# Patient Record
Sex: FEMALE | Race: WHITE | Employment: UNEMPLOYED | URBAN - NONMETROPOLITAN AREA
[De-identification: names, ages, dates, MRNs, and addresses within clinical notes are randomized per-mention and may not be internally consistent; named-entity substitution may affect disease eponyms.]

---

## 2017-05-01 ENCOUNTER — HOSPITAL ENCOUNTER (OUTPATIENT)
Dept: LAB | Age: 32
Discharge: HOME | End: 2017-05-01
Payer: MEDICARE

## 2017-05-01 LAB
ALBUMIN SERPL-MCNC: 4.4 G/DL (ref 3.5–5.2)
ALBUMIN/GLOBULIN RATIO: 1.5 (ref 1–2.5)
ALP BLD-CCNC: 80 U/L (ref 35–104)
ALT SERPL-CCNC: 15 U/L (ref 5–33)
ANION GAP SERPL CALCULATED.3IONS-SCNC: 12 MMOL/L (ref 9–17)
AST SERPL-CCNC: 15 U/L
BILIRUB SERPL-MCNC: <0.1 MG/DL (ref 0.3–1.2)
BUN BLDV-MCNC: 9 MG/DL (ref 6–20)
BUN/CREAT BLD: 16 (ref 9–20)
CALCIUM SERPL-MCNC: 9.3 MG/DL (ref 8.6–10.4)
CARBAMAZEPINE DATE LAST DOSE: ABNORMAL
CARBAMAZEPINE DOSE AMOUNT: ABNORMAL
CARBAMAZEPINE DOSE TIME: ABNORMAL
CARBAMAZEPINE LEVEL: <2.5 UG/ML (ref 8–12)
CHLORIDE BLD-SCNC: 97 MMOL/L (ref 98–107)
CO2: 27 MMOL/L (ref 20–31)
CREAT SERPL-MCNC: 0.55 MG/DL (ref 0.5–0.9)
GFR AFRICAN AMERICAN: >60 ML/MIN
GFR NON-AFRICAN AMERICAN: >60 ML/MIN
GFR SERPL CREATININE-BSD FRML MDRD: ABNORMAL ML/MIN/{1.73_M2}
GFR SERPL CREATININE-BSD FRML MDRD: ABNORMAL ML/MIN/{1.73_M2}
GLUCOSE BLD-MCNC: 88 MG/DL (ref 70–99)
POTASSIUM SERPL-SCNC: 4.2 MMOL/L (ref 3.7–5.3)
SODIUM BLD-SCNC: 136 MMOL/L (ref 135–144)
TOTAL PROTEIN: 7.4 G/DL (ref 6.4–8.3)

## 2017-05-01 PROCEDURE — 80053 COMPREHEN METABOLIC PANEL: CPT

## 2017-05-01 PROCEDURE — 36415 COLL VENOUS BLD VENIPUNCTURE: CPT

## 2017-05-01 PROCEDURE — 80156 ASSAY CARBAMAZEPINE TOTAL: CPT

## 2017-06-12 RX ORDER — MAGNESIUM HYDROXIDE/ALUMINUM HYDROXICE/SIMETHICONE 120; 1200; 1200 MG/30ML; MG/30ML; MG/30ML
5 SUSPENSION ORAL EVERY 6 HOURS PRN
COMMUNITY
End: 2018-04-16 | Stop reason: ALTCHOICE

## 2017-06-12 RX ORDER — DICYCLOMINE HYDROCHLORIDE 10 MG/1
10 CAPSULE ORAL 3 TIMES DAILY PRN
COMMUNITY
End: 2019-01-15 | Stop reason: ALTCHOICE

## 2017-06-12 RX ORDER — GABAPENTIN 300 MG/1
300 CAPSULE ORAL 3 TIMES DAILY
COMMUNITY
End: 2018-04-16 | Stop reason: ALTCHOICE

## 2017-06-12 RX ORDER — TRAZODONE HYDROCHLORIDE 100 MG/1
100 TABLET ORAL NIGHTLY
COMMUNITY
End: 2018-04-16 | Stop reason: ALTCHOICE

## 2017-06-12 RX ORDER — ARIPIPRAZOLE 15 MG/1
15 TABLET ORAL DAILY
COMMUNITY
End: 2019-10-31 | Stop reason: ALTCHOICE

## 2017-06-12 RX ORDER — METHOCARBAMOL 750 MG/1
750 TABLET, FILM COATED ORAL 3 TIMES DAILY
COMMUNITY
End: 2018-04-16 | Stop reason: ALTCHOICE

## 2017-06-12 RX ORDER — RANITIDINE 300 MG/1
300 TABLET ORAL DAILY
COMMUNITY
End: 2018-04-16 | Stop reason: ALTCHOICE

## 2017-06-12 RX ORDER — CARBAMAZEPINE 100 MG/1
200 TABLET, EXTENDED RELEASE ORAL 2 TIMES DAILY
COMMUNITY
End: 2019-04-23 | Stop reason: ALTCHOICE

## 2017-06-12 RX ORDER — LORATADINE 10 MG/1
10 TABLET ORAL DAILY
COMMUNITY
End: 2018-04-16 | Stop reason: ALTCHOICE

## 2017-06-12 RX ORDER — LOPERAMIDE HYDROCHLORIDE 2 MG/1
2 CAPSULE ORAL 4 TIMES DAILY PRN
COMMUNITY
End: 2018-04-16 | Stop reason: ALTCHOICE

## 2017-08-24 ENCOUNTER — COMMUNITY OUTREACH (OUTPATIENT)
Dept: OTHER | Facility: CLINIC | Age: 32
End: 2017-08-24

## 2018-02-01 ENCOUNTER — HOSPITAL ENCOUNTER (EMERGENCY)
Age: 33
Discharge: HOME | End: 2018-02-01
Attending: EMERGENCY MEDICINE
Payer: MEDICARE

## 2018-02-01 VITALS
RESPIRATION RATE: 16 BRPM | TEMPERATURE: 96.8 F | HEART RATE: 87 BPM | SYSTOLIC BLOOD PRESSURE: 133 MMHG | BODY MASS INDEX: 33.89 KG/M2 | WEIGHT: 210 LBS | OXYGEN SATURATION: 99 % | DIASTOLIC BLOOD PRESSURE: 86 MMHG

## 2018-02-01 DIAGNOSIS — B86 SCABIES: Primary | ICD-10-CM

## 2018-02-01 PROCEDURE — 99282 EMERGENCY DEPT VISIT SF MDM: CPT

## 2018-02-01 RX ORDER — PERMETHRIN 50 MG/G
CREAM TOPICAL
Qty: 1 TUBE | Refills: 1 | Status: SHIPPED | OUTPATIENT
Start: 2018-02-01 | End: 2018-04-16 | Stop reason: ALTCHOICE

## 2018-02-01 ASSESSMENT — PAIN DESCRIPTION - PAIN TYPE: TYPE: ACUTE PAIN

## 2018-02-01 ASSESSMENT — PAIN SCALES - GENERAL: PAINLEVEL_OUTOF10: 10

## 2018-02-01 ASSESSMENT — PAIN DESCRIPTION - ORIENTATION: ORIENTATION: LEFT;RIGHT

## 2018-02-01 ASSESSMENT — PAIN DESCRIPTION - LOCATION: LOCATION: HAND;FOOT

## 2018-02-01 NOTE — ED PROVIDER NOTES
eMERGENCY dEPARTMENT eNCOUnter      CHIEF COMPLAINT    Chief Complaint   Patient presents with    Insect Bite     pt states seh thinks she has a parasite, \"there are little black bugs crawling all over me\"        HPI    Cj Garcia is a 33 y.o. female who presents With several week history of pruritic erythematous punctate rash of bilateral feet and hands which appears to be getting progressively worse. Patient describes intense nocturnal itching. Patient does live at home with multiple pets.     PAST MEDICAL HISTORY    Past Medical History:   Diagnosis Date    Bipolar affective disorder ([address]     GERD (gastroesophageal reflux disease)     Hiatal hernia     Irritable bowel syndrome     Low back pain        SURGICAL HISTORY    Past Surgical History:   Procedure Laterality Date     SECTION      STERILIZATION  2012    [facility]       CURRENT MEDICATIONS    Current Outpatient Rx   Medication Sig Dispense Refill    ranitidine (ZANTAC) 300 MG tablet Take 300 mg by mouth daily      omeprazole (PRILOSEC) 40 MG capsule Take 40 mg by mouth 2 times daily       ARIPiprazole (ABILIFY) 15 MG tablet Take 15 mg by mouth daily      carBAMazepine (TEGRETOL-XR) 100 MG extended release tablet Take 200 mg by mouth 2 times daily      dicyclomine (BENTYL) 10 MG capsule Take 10 mg by mouth 3 times daily as needed      gabapentin (NEURONTIN) 300 MG capsule Take 300 mg by mouth 3 times daily      loperamide (IMODIUM) 2 MG capsule Take 2 mg by mouth 4 times daily as needed for Diarrhea      loratadine (CLARITIN) 10 MG tablet Take 10 mg by mouth daily      aluminum & magnesium hydroxide-simethicone (MAALOX) 200-200-20 MG/5ML SUSP suspension Take 5 mLs by mouth every 6 hours as needed for Indigestion      methocarbamol (ROBAXIN) 750 MG tablet Take 750 mg by mouth 3 times daily      traZODone (DESYREL) 100 MG tablet Take 100 mg by mouth nightly      PROAIR  (90 BASE) MCG/ACT inhaler   0    fluticasone Eliu Devlin Do [facility]  [phone]      As needed        Final Impression:   1.  Scabies               (Please note that portions of this note were completed with a voice recognition program.  Efforts were made to edit the dictations but occasionally words are mis-transcribed.)          Bety Briceno DO  02/01/18 1455

## 2018-04-16 ENCOUNTER — HOSPITAL ENCOUNTER (EMERGENCY)
Age: 33
Discharge: HOME | End: 2018-04-16
Payer: MEDICARE

## 2018-04-16 VITALS
HEART RATE: 96 BPM | SYSTOLIC BLOOD PRESSURE: 145 MMHG | TEMPERATURE: 98 F | OXYGEN SATURATION: 97 % | RESPIRATION RATE: 16 BRPM | DIASTOLIC BLOOD PRESSURE: 86 MMHG

## 2018-04-16 DIAGNOSIS — R29.898 TMJ CLICK: Primary | ICD-10-CM

## 2018-04-16 DIAGNOSIS — R42 DIZZINESS: ICD-10-CM

## 2018-04-16 LAB
-: ABNORMAL
AMORPHOUS: ABNORMAL
BACTERIA: ABNORMAL
BILIRUBIN URINE: NEGATIVE
CASTS UA: ABNORMAL /LPF
COLOR: YELLOW
COMMENT UA: ABNORMAL
CRYSTALS, UA: ABNORMAL /HPF
EKG ATRIAL RATE: 79 BPM
EKG P AXIS: 57 DEGREES
EKG P-R INTERVAL: 164 MS
EKG Q-T INTERVAL: 390 MS
EKG QRS DURATION: 92 MS
EKG QTC CALCULATION (BAZETT): 447 MS
EKG R AXIS: 63 DEGREES
EKG T AXIS: 50 DEGREES
EKG VENTRICULAR RATE: 79 BPM
EPITHELIAL CELLS UA: ABNORMAL /HPF (ref 0–25)
GLUCOSE URINE: NEGATIVE
HCG(URINE) PREGNANCY TEST: NEGATIVE
KETONES, URINE: NEGATIVE
LEUKOCYTE ESTERASE, URINE: ABNORMAL
MUCUS: ABNORMAL
NITRITE, URINE: NEGATIVE
OTHER OBSERVATIONS UA: ABNORMAL
PH UA: 6.5 (ref 5–9)
PROTEIN UA: NEGATIVE
RBC UA: ABNORMAL /HPF (ref 0–2)
RENAL EPITHELIAL, UA: ABNORMAL /HPF
SPECIFIC GRAVITY UA: 1.02 (ref 1.01–1.02)
TRICHOMONAS: ABNORMAL
TURBIDITY: CLEAR
URINE HGB: ABNORMAL
UROBILINOGEN, URINE: NORMAL
WBC UA: ABNORMAL /HPF (ref 0–5)
YEAST: ABNORMAL

## 2018-04-16 PROCEDURE — 99283 EMERGENCY DEPT VISIT LOW MDM: CPT

## 2018-04-16 PROCEDURE — 93005 ELECTROCARDIOGRAM TRACING: CPT

## 2018-04-16 PROCEDURE — 96372 THER/PROPH/DIAG INJ SC/IM: CPT

## 2018-04-16 PROCEDURE — 6370000000 HC RX 637 (ALT 250 FOR IP): Performed by: PHYSICIAN ASSISTANT

## 2018-04-16 PROCEDURE — 87086 URINE CULTURE/COLONY COUNT: CPT

## 2018-04-16 PROCEDURE — 6360000002 HC RX W HCPCS: Performed by: PHYSICIAN ASSISTANT

## 2018-04-16 PROCEDURE — 81001 URINALYSIS AUTO W/SCOPE: CPT

## 2018-04-16 PROCEDURE — 84703 CHORIONIC GONADOTROPIN ASSAY: CPT

## 2018-04-16 RX ORDER — KETOROLAC TROMETHAMINE 30 MG/ML
30 INJECTION, SOLUTION INTRAMUSCULAR; INTRAVENOUS ONCE
Status: COMPLETED | OUTPATIENT
Start: 2018-04-16 | End: 2018-04-16

## 2018-04-16 RX ORDER — MECLIZINE HCL 12.5 MG/1
25 TABLET ORAL ONCE
Status: COMPLETED | OUTPATIENT
Start: 2018-04-16 | End: 2018-04-16

## 2018-04-16 RX ORDER — MECLIZINE HYDROCHLORIDE 25 MG/1
25 TABLET ORAL 3 TIMES DAILY PRN
Qty: 21 TABLET | Refills: 0 | Status: SHIPPED | OUTPATIENT
Start: 2018-04-16 | End: 2018-04-26

## 2018-04-16 RX ORDER — DIAZEPAM 2 MG/1
2 TABLET ORAL EVERY 8 HOURS PRN
Qty: 10 TABLET | Refills: 0 | Status: SHIPPED | OUTPATIENT
Start: 2018-04-16 | End: 2018-04-16 | Stop reason: CLARIF

## 2018-04-16 RX ORDER — IBUPROFEN 800 MG/1
800 TABLET ORAL EVERY 8 HOURS PRN
Qty: 21 TABLET | Refills: 0 | Status: SHIPPED | OUTPATIENT
Start: 2018-04-16 | End: 2018-10-12 | Stop reason: ALTCHOICE

## 2018-04-16 RX ADMIN — MECLIZINE 25 MG: 12.5 TABLET ORAL at 14:45

## 2018-04-16 RX ADMIN — KETOROLAC TROMETHAMINE 30 MG: 30 INJECTION, SOLUTION INTRAMUSCULAR at 15:30

## 2018-04-16 ASSESSMENT — ENCOUNTER SYMPTOMS
CHEST TIGHTNESS: 0
NAUSEA: 0
DIARRHEA: 0
BLOOD IN STOOL: 0
SHORTNESS OF BREATH: 0
CONSTIPATION: 0
ABDOMINAL PAIN: 0
COUGH: 0
VOMITING: 0
RHINORRHEA: 0
BACK PAIN: 0
WHEEZING: 0
EYE DISCHARGE: 0
EYE REDNESS: 0
SORE THROAT: 0

## 2018-04-16 ASSESSMENT — PAIN DESCRIPTION - LOCATION: LOCATION: GENERALIZED;JAW

## 2018-04-16 ASSESSMENT — PAIN DESCRIPTION - DESCRIPTORS: DESCRIPTORS: ACHING

## 2018-04-16 ASSESSMENT — PAIN SCALES - GENERAL: PAINLEVEL_OUTOF10: 5

## 2018-04-16 ASSESSMENT — PAIN DESCRIPTION - FREQUENCY: FREQUENCY: CONTINUOUS

## 2018-04-16 ASSESSMENT — PAIN DESCRIPTION - PAIN TYPE: TYPE: ACUTE PAIN

## 2018-04-17 LAB
CULTURE: NORMAL
CULTURE: NORMAL
Lab: NORMAL
Lab: NORMAL
SPECIMEN DESCRIPTION: NORMAL
SPECIMEN DESCRIPTION: NORMAL
STATUS: NORMAL

## 2018-04-19 ENCOUNTER — HOSPITAL ENCOUNTER (EMERGENCY)
Age: 33
Discharge: HOME | End: 2018-04-20
Attending: FAMILY MEDICINE
Payer: MEDICARE

## 2018-04-19 DIAGNOSIS — R42 DIZZINESS: ICD-10-CM

## 2018-04-19 DIAGNOSIS — R11.0 NAUSEA: Primary | ICD-10-CM

## 2018-04-19 DIAGNOSIS — F41.9 ACUTE ANXIETY: ICD-10-CM

## 2018-04-19 LAB
ABSOLUTE EOS #: 0.28 K/UL (ref 0–0.44)
ABSOLUTE IMMATURE GRANULOCYTE: 0.03 K/UL (ref 0–0.3)
ABSOLUTE LYMPH #: 3.13 K/UL (ref 1.1–3.7)
ABSOLUTE MONO #: 0.65 K/UL (ref 0.1–1.2)
BASOPHILS # BLD: 1 % (ref 0–2)
BASOPHILS ABSOLUTE: 0.05 K/UL (ref 0–0.2)
DIFFERENTIAL TYPE: ABNORMAL
EKG ATRIAL RATE: 96 BPM
EKG P AXIS: 44 DEGREES
EKG P-R INTERVAL: 170 MS
EKG Q-T INTERVAL: 358 MS
EKG QRS DURATION: 92 MS
EKG QTC CALCULATION (BAZETT): 452 MS
EKG R AXIS: 52 DEGREES
EKG T AXIS: 48 DEGREES
EKG VENTRICULAR RATE: 96 BPM
EOSINOPHILS RELATIVE PERCENT: 3 % (ref 1–4)
HCG QUALITATIVE: NEGATIVE
HCT VFR BLD CALC: 38.5 % (ref 36.3–47.1)
HEMOGLOBIN: 12.5 G/DL (ref 11.9–15.1)
IMMATURE GRANULOCYTES: 0 %
LACTIC ACID, WHOLE BLOOD: NORMAL MMOL/L (ref 0.7–2.1)
LACTIC ACID: 1 MMOL/L (ref 0.5–2.2)
LYMPHOCYTES # BLD: 36 % (ref 24–43)
MCH RBC QN AUTO: 29.1 PG (ref 25.2–33.5)
MCHC RBC AUTO-ENTMCNC: 32.5 G/DL (ref 28.4–34.8)
MCV RBC AUTO: 89.5 FL (ref 82.6–102.9)
MONOCYTES # BLD: 7 % (ref 3–12)
NRBC AUTOMATED: 0 PER 100 WBC
PDW BLD-RTO: 16.3 % (ref 11.8–14.4)
PLATELET # BLD: 367 K/UL (ref 138–453)
PLATELET ESTIMATE: ABNORMAL
PMV BLD AUTO: 10.3 FL (ref 8.1–13.5)
RBC # BLD: 4.3 M/UL (ref 3.95–5.11)
RBC # BLD: ABNORMAL 10*6/UL
SEG NEUTROPHILS: 53 % (ref 36–65)
SEGMENTED NEUTROPHILS ABSOLUTE COUNT: 4.59 K/UL (ref 1.5–8.1)
WBC # BLD: 8.7 K/UL (ref 3.5–11.3)
WBC # BLD: ABNORMAL 10*3/UL

## 2018-04-19 PROCEDURE — 80076 HEPATIC FUNCTION PANEL: CPT

## 2018-04-19 PROCEDURE — 83605 ASSAY OF LACTIC ACID: CPT

## 2018-04-19 PROCEDURE — 84484 ASSAY OF TROPONIN QUANT: CPT

## 2018-04-19 PROCEDURE — 81001 URINALYSIS AUTO W/SCOPE: CPT

## 2018-04-19 PROCEDURE — 99284 EMERGENCY DEPT VISIT MOD MDM: CPT

## 2018-04-19 PROCEDURE — 85025 COMPLETE CBC W/AUTO DIFF WBC: CPT

## 2018-04-19 PROCEDURE — 6360000002 HC RX W HCPCS: Performed by: FAMILY MEDICINE

## 2018-04-19 PROCEDURE — C9113 INJ PANTOPRAZOLE SODIUM, VIA: HCPCS

## 2018-04-19 PROCEDURE — 84703 CHORIONIC GONADOTROPIN ASSAY: CPT

## 2018-04-19 PROCEDURE — 83690 ASSAY OF LIPASE: CPT

## 2018-04-19 PROCEDURE — 6370000000 HC RX 637 (ALT 250 FOR IP): Performed by: FAMILY MEDICINE

## 2018-04-19 PROCEDURE — 84443 ASSAY THYROID STIM HORMONE: CPT

## 2018-04-19 PROCEDURE — 85610 PROTHROMBIN TIME: CPT

## 2018-04-19 PROCEDURE — 36415 COLL VENOUS BLD VENIPUNCTURE: CPT

## 2018-04-19 PROCEDURE — 96375 TX/PRO/DX INJ NEW DRUG ADDON: CPT

## 2018-04-19 PROCEDURE — 96374 THER/PROPH/DIAG INJ IV PUSH: CPT

## 2018-04-19 PROCEDURE — 6360000002 HC RX W HCPCS

## 2018-04-19 PROCEDURE — 80306 DRUG TEST PRSMV INSTRMNT: CPT

## 2018-04-19 PROCEDURE — G0480 DRUG TEST DEF 1-7 CLASSES: HCPCS

## 2018-04-19 PROCEDURE — 80048 BASIC METABOLIC PNL TOTAL CA: CPT

## 2018-04-19 RX ORDER — ONDANSETRON 2 MG/ML
4 INJECTION INTRAMUSCULAR; INTRAVENOUS ONCE
Status: COMPLETED | OUTPATIENT
Start: 2018-04-19 | End: 2018-04-19

## 2018-04-19 RX ORDER — 0.9 % SODIUM CHLORIDE 0.9 %
10 VIAL (ML) INJECTION DAILY
Status: DISCONTINUED | OUTPATIENT
Start: 2018-04-20 | End: 2018-04-20 | Stop reason: HOSPADM

## 2018-04-19 RX ORDER — PANTOPRAZOLE SODIUM 40 MG/10ML
INJECTION, POWDER, LYOPHILIZED, FOR SOLUTION INTRAVENOUS
Status: COMPLETED
Start: 2018-04-19 | End: 2018-04-19

## 2018-04-19 RX ORDER — PANTOPRAZOLE SODIUM 40 MG/10ML
40 INJECTION, POWDER, LYOPHILIZED, FOR SOLUTION INTRAVENOUS DAILY
Status: DISCONTINUED | OUTPATIENT
Start: 2018-04-19 | End: 2018-04-20 | Stop reason: HOSPADM

## 2018-04-19 RX ADMIN — Medication 30 ML: at 23:35

## 2018-04-19 RX ADMIN — PANTOPRAZOLE SODIUM 40 MG: 40 INJECTION, POWDER, FOR SOLUTION INTRAVENOUS at 23:37

## 2018-04-19 RX ADMIN — PANTOPRAZOLE SODIUM 40 MG: 40 INJECTION, POWDER, LYOPHILIZED, FOR SOLUTION INTRAVENOUS at 23:37

## 2018-04-19 RX ADMIN — ONDANSETRON 4 MG: 2 INJECTION INTRAMUSCULAR; INTRAVENOUS at 23:36

## 2018-04-20 VITALS
RESPIRATION RATE: 16 BRPM | OXYGEN SATURATION: 100 % | TEMPERATURE: 98.6 F | HEART RATE: 73 BPM | DIASTOLIC BLOOD PRESSURE: 81 MMHG | SYSTOLIC BLOOD PRESSURE: 140 MMHG

## 2018-04-20 LAB
-: ABNORMAL
ALBUMIN SERPL-MCNC: 4.5 G/DL (ref 3.5–5.2)
ALBUMIN/GLOBULIN RATIO: 1.5 (ref 1–2.5)
ALP BLD-CCNC: 76 U/L (ref 35–104)
ALT SERPL-CCNC: 10 U/L (ref 5–33)
AMORPHOUS: ABNORMAL
AMPHETAMINE SCREEN URINE: POSITIVE
ANION GAP SERPL CALCULATED.3IONS-SCNC: 14 MMOL/L (ref 9–17)
AST SERPL-CCNC: 15 U/L
BACTERIA: ABNORMAL
BARBITURATE SCREEN URINE: NEGATIVE
BENZODIAZEPINE SCREEN, URINE: POSITIVE
BILIRUB SERPL-MCNC: <0.1 MG/DL (ref 0.3–1.2)
BILIRUBIN DIRECT: <0.08 MG/DL
BILIRUBIN URINE: NEGATIVE
BILIRUBIN, INDIRECT: ABNORMAL MG/DL (ref 0–1)
BUN BLDV-MCNC: 9 MG/DL (ref 6–20)
BUN/CREAT BLD: 13 (ref 9–20)
BUPRENORPHINE URINE: NEGATIVE
CALCIUM SERPL-MCNC: 9.8 MG/DL (ref 8.6–10.4)
CANNABINOID SCREEN URINE: POSITIVE
CASTS UA: ABNORMAL /LPF
CHLORIDE BLD-SCNC: 97 MMOL/L (ref 98–107)
CO2: 26 MMOL/L (ref 20–31)
COCAINE METABOLITE, URINE: NEGATIVE
COLOR: YELLOW
COMMENT UA: ABNORMAL
CREAT SERPL-MCNC: 0.72 MG/DL (ref 0.5–0.9)
CRYSTALS, UA: ABNORMAL /HPF
EPITHELIAL CELLS UA: ABNORMAL /HPF (ref 0–25)
ETHANOL PERCENT: <0.01 %
ETHANOL: <10 MG/DL
GFR AFRICAN AMERICAN: >60 ML/MIN
GFR NON-AFRICAN AMERICAN: >60 ML/MIN
GFR SERPL CREATININE-BSD FRML MDRD: ABNORMAL ML/MIN/{1.73_M2}
GFR SERPL CREATININE-BSD FRML MDRD: ABNORMAL ML/MIN/{1.73_M2}
GLOBULIN: ABNORMAL G/DL (ref 1.5–3.8)
GLUCOSE BLD-MCNC: 98 MG/DL (ref 70–99)
GLUCOSE URINE: NEGATIVE
INR BLD: 0.9 (ref 0.9–1.2)
KETONES, URINE: NEGATIVE
LEUKOCYTE ESTERASE, URINE: ABNORMAL
LIPASE: 40 U/L (ref 13–60)
MDMA URINE: ABNORMAL
METHADONE SCREEN, URINE: NEGATIVE
METHAMPHETAMINE, URINE: POSITIVE
MUCUS: ABNORMAL
NITRITE, URINE: NEGATIVE
OPIATES, URINE: NEGATIVE
OTHER OBSERVATIONS UA: ABNORMAL
OXYCODONE SCREEN URINE: NEGATIVE
PH UA: 6.5 (ref 5–9)
PHENCYCLIDINE, URINE: NEGATIVE
POTASSIUM SERPL-SCNC: 3.4 MMOL/L (ref 3.7–5.3)
PROPOXYPHENE, URINE: NEGATIVE
PROTEIN UA: NEGATIVE
PROTHROMBIN TIME: 9.9 SEC (ref 9.7–12.2)
RBC UA: ABNORMAL /HPF (ref 0–2)
RENAL EPITHELIAL, UA: ABNORMAL /HPF
SODIUM BLD-SCNC: 137 MMOL/L (ref 135–144)
SPECIFIC GRAVITY UA: <1.005 (ref 1.01–1.02)
TEST INFORMATION: ABNORMAL
TOTAL PROTEIN: 7.5 G/DL (ref 6.4–8.3)
TRICHOMONAS: ABNORMAL
TRICYCLIC ANTIDEPRESSANTS, UR: NEGATIVE
TROPONIN INTERP: NORMAL
TROPONIN T: <0.03 NG/ML
TSH SERPL DL<=0.05 MIU/L-ACNC: 1.36 MIU/L (ref 0.3–5)
TURBIDITY: CLEAR
URINE HGB: ABNORMAL
UROBILINOGEN, URINE: NORMAL
WBC UA: ABNORMAL /HPF (ref 0–5)
YEAST: ABNORMAL

## 2018-04-20 PROCEDURE — 93005 ELECTROCARDIOGRAM TRACING: CPT

## 2018-04-20 RX ORDER — ONDANSETRON 4 MG/1
4 TABLET, ORALLY DISINTEGRATING ORAL EVERY 4 HOURS PRN
Qty: 10 TABLET | Refills: 0 | Status: SHIPPED | OUTPATIENT
Start: 2018-04-20 | End: 2019-01-15 | Stop reason: ALTCHOICE

## 2018-04-20 RX ORDER — PROMETHAZINE HYDROCHLORIDE 25 MG/1
25 TABLET ORAL EVERY 6 HOURS PRN
Qty: 10 TABLET | Refills: 0 | Status: SHIPPED | OUTPATIENT
Start: 2018-04-20 | End: 2018-04-27

## 2018-10-12 ENCOUNTER — HOSPITAL ENCOUNTER (EMERGENCY)
Age: 33
Discharge: HOME | End: 2018-10-12
Attending: EMERGENCY MEDICINE
Payer: MEDICARE

## 2018-10-12 ENCOUNTER — APPOINTMENT (OUTPATIENT)
Dept: CT IMAGING | Age: 33
End: 2018-10-12
Payer: MEDICARE

## 2018-10-12 VITALS
TEMPERATURE: 98.6 F | RESPIRATION RATE: 12 BRPM | DIASTOLIC BLOOD PRESSURE: 64 MMHG | SYSTOLIC BLOOD PRESSURE: 118 MMHG | OXYGEN SATURATION: 100 % | HEART RATE: 92 BPM

## 2018-10-12 DIAGNOSIS — M54.12 CERVICAL RADICULOPATHY: Primary | ICD-10-CM

## 2018-10-12 PROCEDURE — 99284 EMERGENCY DEPT VISIT MOD MDM: CPT

## 2018-10-12 PROCEDURE — 72125 CT NECK SPINE W/O DYE: CPT

## 2018-10-12 RX ORDER — PREDNISONE 20 MG/1
TABLET ORAL
Qty: 10 TABLET | Refills: 0 | Status: SHIPPED | OUTPATIENT
Start: 2018-10-12 | End: 2019-01-15 | Stop reason: ALTCHOICE

## 2018-10-12 ASSESSMENT — PAIN DESCRIPTION - LOCATION: LOCATION: NECK

## 2018-10-12 ASSESSMENT — PAIN SCALES - GENERAL: PAINLEVEL_OUTOF10: 10

## 2018-10-12 ASSESSMENT — PAIN DESCRIPTION - PAIN TYPE: TYPE: ACUTE PAIN

## 2019-01-15 ENCOUNTER — HOSPITAL ENCOUNTER (EMERGENCY)
Age: 34
Discharge: HOME | End: 2019-01-15
Payer: MEDICARE

## 2019-01-15 ENCOUNTER — APPOINTMENT (OUTPATIENT)
Dept: GENERAL RADIOLOGY | Age: 34
End: 2019-01-15
Payer: MEDICARE

## 2019-01-15 VITALS
WEIGHT: 185 LBS | TEMPERATURE: 99.2 F | HEART RATE: 92 BPM | RESPIRATION RATE: 18 BRPM | BODY MASS INDEX: 29.86 KG/M2 | DIASTOLIC BLOOD PRESSURE: 88 MMHG | OXYGEN SATURATION: 98 % | SYSTOLIC BLOOD PRESSURE: 147 MMHG

## 2019-01-15 DIAGNOSIS — J40 BRONCHITIS: Primary | ICD-10-CM

## 2019-01-15 LAB
DIRECT EXAM: NORMAL
Lab: NORMAL
SPECIMEN DESCRIPTION: NORMAL
STATUS: NORMAL

## 2019-01-15 PROCEDURE — 87804 INFLUENZA ASSAY W/OPTIC: CPT

## 2019-01-15 PROCEDURE — 6370000000 HC RX 637 (ALT 250 FOR IP): Performed by: PHYSICIAN ASSISTANT

## 2019-01-15 PROCEDURE — 71046 X-RAY EXAM CHEST 2 VIEWS: CPT

## 2019-01-15 PROCEDURE — 6360000002 HC RX W HCPCS: Performed by: PHYSICIAN ASSISTANT

## 2019-01-15 PROCEDURE — 96372 THER/PROPH/DIAG INJ SC/IM: CPT

## 2019-01-15 PROCEDURE — 94640 AIRWAY INHALATION TREATMENT: CPT

## 2019-01-15 PROCEDURE — 99283 EMERGENCY DEPT VISIT LOW MDM: CPT

## 2019-01-15 PROCEDURE — 94664 DEMO&/EVAL PT USE INHALER: CPT

## 2019-01-15 RX ORDER — IPRATROPIUM BROMIDE AND ALBUTEROL SULFATE 2.5; .5 MG/3ML; MG/3ML
1 SOLUTION RESPIRATORY (INHALATION) EVERY 4 HOURS
Qty: 360 ML | Refills: 0 | Status: SHIPPED | OUTPATIENT
Start: 2019-01-15 | End: 2019-04-23

## 2019-01-15 RX ORDER — DOXYCYCLINE HYCLATE 100 MG/1
100 CAPSULE ORAL 2 TIMES DAILY
COMMUNITY
End: 2019-01-15 | Stop reason: ALTCHOICE

## 2019-01-15 RX ORDER — IPRATROPIUM BROMIDE AND ALBUTEROL SULFATE 2.5; .5 MG/3ML; MG/3ML
1 SOLUTION RESPIRATORY (INHALATION) ONCE
Status: COMPLETED | OUTPATIENT
Start: 2019-01-15 | End: 2019-01-15

## 2019-01-15 RX ORDER — BENZONATATE 100 MG/1
100 CAPSULE ORAL 3 TIMES DAILY PRN
Qty: 21 CAPSULE | Refills: 0 | Status: SHIPPED | OUTPATIENT
Start: 2019-01-15 | End: 2019-01-22

## 2019-01-15 RX ORDER — BUDESONIDE 0.5 MG/2ML
0.5 INHALANT ORAL ONCE
Status: COMPLETED | OUTPATIENT
Start: 2019-01-15 | End: 2019-01-15

## 2019-01-15 RX ORDER — PREDNISONE 10 MG/1
40 TABLET ORAL DAILY
Qty: 16 TABLET | Refills: 0 | Status: SHIPPED | OUTPATIENT
Start: 2019-01-15 | End: 2019-01-19

## 2019-01-15 RX ORDER — DEXAMETHASONE SODIUM PHOSPHATE 4 MG/ML
4 INJECTION, SOLUTION INTRA-ARTICULAR; INTRALESIONAL; INTRAMUSCULAR; INTRAVENOUS; SOFT TISSUE ONCE
Status: COMPLETED | OUTPATIENT
Start: 2019-01-15 | End: 2019-01-15

## 2019-01-15 RX ORDER — AZITHROMYCIN 250 MG/1
TABLET, FILM COATED ORAL
Qty: 1 PACKET | Refills: 0 | Status: SHIPPED | OUTPATIENT
Start: 2019-01-15 | End: 2019-01-19

## 2019-01-15 RX ADMIN — IPRATROPIUM BROMIDE AND ALBUTEROL SULFATE 1 AMPULE: .5; 3 SOLUTION RESPIRATORY (INHALATION) at 14:49

## 2019-01-15 RX ADMIN — BUDESONIDE 500 MCG: 0.5 SUSPENSION RESPIRATORY (INHALATION) at 14:49

## 2019-01-15 RX ADMIN — DEXAMETHASONE SODIUM PHOSPHATE 4 MG: 4 INJECTION, SOLUTION INTRA-ARTICULAR; INTRALESIONAL; INTRAMUSCULAR; INTRAVENOUS; SOFT TISSUE at 15:18

## 2019-01-15 ASSESSMENT — ENCOUNTER SYMPTOMS
WHEEZING: 1
COUGH: 1
NAUSEA: 0
BACK PAIN: 0
ABDOMINAL PAIN: 0
EYE REDNESS: 0
SORE THROAT: 0
BLOOD IN STOOL: 0
CHEST TIGHTNESS: 0
CONSTIPATION: 0
SHORTNESS OF BREATH: 0
RHINORRHEA: 0
DIARRHEA: 0
VOMITING: 0
EYE DISCHARGE: 0

## 2019-01-15 ASSESSMENT — PAIN DESCRIPTION - LOCATION: LOCATION: GENERALIZED

## 2019-01-15 ASSESSMENT — PAIN DESCRIPTION - DESCRIPTORS: DESCRIPTORS: SORE

## 2019-01-15 ASSESSMENT — PAIN DESCRIPTION - PAIN TYPE: TYPE: ACUTE PAIN

## 2019-01-15 ASSESSMENT — PAIN SCALES - GENERAL: PAINLEVEL_OUTOF10: 6

## 2019-01-25 ENCOUNTER — HOSPITAL ENCOUNTER (EMERGENCY)
Age: 34
Discharge: LEFT BEFORE DISCHARGE | End: 2019-01-25
Payer: MEDICARE

## 2019-04-23 ENCOUNTER — OFFICE VISIT (OUTPATIENT)
Dept: SURGERY | Age: 34
End: 2019-04-23
Payer: MEDICARE

## 2019-04-23 VITALS
SYSTOLIC BLOOD PRESSURE: 108 MMHG | RESPIRATION RATE: 20 BRPM | HEIGHT: 67 IN | HEART RATE: 86 BPM | WEIGHT: 205.7 LBS | BODY MASS INDEX: 32.28 KG/M2 | DIASTOLIC BLOOD PRESSURE: 66 MMHG | TEMPERATURE: 96.7 F

## 2019-04-23 DIAGNOSIS — R10.13 EPIGASTRIC PAIN: Primary | ICD-10-CM

## 2019-04-23 DIAGNOSIS — K59.09 CHRONIC CONSTIPATION: ICD-10-CM

## 2019-04-23 DIAGNOSIS — K21.9 GASTROESOPHAGEAL REFLUX DISEASE, ESOPHAGITIS PRESENCE NOT SPECIFIED: ICD-10-CM

## 2019-04-23 PROCEDURE — 99203 OFFICE O/P NEW LOW 30 MIN: CPT | Performed by: SURGERY

## 2019-04-23 PROCEDURE — G8427 DOCREV CUR MEDS BY ELIG CLIN: HCPCS | Performed by: SURGERY

## 2019-04-23 PROCEDURE — G8417 CALC BMI ABV UP PARAM F/U: HCPCS | Performed by: SURGERY

## 2019-04-23 PROCEDURE — 4004F PT TOBACCO SCREEN RCVD TLK: CPT | Performed by: SURGERY

## 2019-04-23 RX ORDER — GABAPENTIN 300 MG/1
CAPSULE ORAL
COMMUNITY
Start: 2019-01-17

## 2019-04-23 RX ORDER — CYCLOBENZAPRINE HCL 10 MG
TABLET ORAL
COMMUNITY
End: 2019-10-26

## 2019-04-23 RX ORDER — SUCRALFATE ORAL 1 G/10ML
1 SUSPENSION ORAL 4 TIMES DAILY
Qty: 420 ML | Refills: 1 | Status: SHIPPED | OUTPATIENT
Start: 2019-04-23 | End: 2019-10-26

## 2019-04-23 ASSESSMENT — ENCOUNTER SYMPTOMS
COUGH: 0
TROUBLE SWALLOWING: 0
ABDOMINAL DISTENTION: 1
SORE THROAT: 0
ABDOMINAL PAIN: 1
NAUSEA: 1
BACK PAIN: 1
CONSTIPATION: 1
CHOKING: 0
BLOOD IN STOOL: 0
SHORTNESS OF BREATH: 0
VOMITING: 0

## 2019-04-23 NOTE — COMMUNICATION BODY
Assessment:      Diagnosis Orders   1. Epigastric pain     2. Gastroesophageal reflux disease, esophagitis presence not specified     3. Chronic constipation           Plan:     Discussed importance of a healthy high fiber low fat diet with fiber supplementation, increased water intake, decreased caffeine, appropriate use of milk of mag, etc.  Encouraged complete tobacco cessation. Continue Prilosec. Rx Carafate provided. Will plan EGD.   Risks, benefits, alternatives thoroughly reviewed and accepted by Ms Karley Aguilar, including remote risk of GI bleeding, perforation, missed lesions, etc.

## 2019-04-23 NOTE — LETTER
[address] [facility]  [address]  Phone: [phone]  Fax: [phone]    Marquis Clarisa MD        April 23, 2019     Charla Huertas, APRN - [facility]  [address] [facility] [address]    Patient: Mague Horvath  MR Number: [id number]  YOB: 1985  Date of Visit: 4/23/2019    Dear Charla Huertas:    Thank you for the request for consultation for Jeny Etienne to me for the evaluation of abdominal pain. Below are the relevant portions of my assessment and plan of care. Assessment:      Diagnosis Orders   1. Epigastric pain     2. Gastroesophageal reflux disease, esophagitis presence not specified     3. Chronic constipation           Plan:     Discussed importance of a healthy high fiber low fat diet with fiber supplementation, increased water intake, decreased caffeine, appropriate use of milk of mag, etc.  Encouraged complete tobacco cessation. Continue Prilosec. Rx Carafate provided. Will plan EGD. Risks, benefits, alternatives thoroughly reviewed and accepted by Ms Lucas Quinones, including remote risk of GI bleeding, perforation, missed lesions, etc.  If you have questions, please do not hesitate to call me. I look forward to following Deborah Shields along with you.     Sincerely,        Marquis Clarisa MD

## 2019-04-23 NOTE — PROGRESS NOTES
Subjective:      Patient ID: Luis Fatima is a 29 y.o. female. HPI     Ms Lucio Bhatia is a 30 yo WF with a very long history of abdominal cramping, chronic constipation, GERD symptoms. Told previously that she has irritable bowel. EGD and colonoscopy over 5 years ago in [place] ([place]). Had hiatal hernia, stomach ulcer. No recent weight changes, currently 205#. BM's once weekly, painful. Does not drink water. Coffee 2 cups daily. Smokes daily. No fevers nor chills. No recent travel nor sick contacts. Neurontin for chronic back pain. Gallbladder US normal 2017. Review of Systems   Constitutional: Negative for activity change, appetite change, chills, fever and unexpected weight change. HENT: Negative for nosebleeds, sneezing, sore throat and trouble swallowing. Eyes: Negative for visual disturbance. Respiratory: Negative for cough, choking and shortness of breath. Cardiovascular: Negative for chest pain, palpitations and leg swelling. Gastrointestinal: Positive for abdominal distention, abdominal pain, constipation and nausea. Negative for blood in stool and vomiting. Genitourinary: Negative for dysuria, flank pain and hematuria. Musculoskeletal: Positive for back pain. Negative for gait problem and myalgias. Allergic/Immunologic: Negative for immunocompromised state. Neurological: Negative for dizziness, seizures, syncope, weakness and headaches. Hematological: Does not bruise/bleed easily. Psychiatric/Behavioral: Negative for confusion and sleep disturbance. Past Medical History:   Diagnosis Date    Bipolar affective disorder ([address]     Depression     GERD (gastroesophageal reflux disease)     Hiatal hernia     Irritable bowel syndrome     Low back pain        Past Surgical History:   Procedure Laterality Date     SECTION      HYSTERECTOMY      STERILIZATION      [place]       History reviewed. No pertinent family history.     Allergies:  See list    Current Outpatient Medications   Medication Sig Dispense Refill    gabapentin (NEURONTIN) 300 MG capsule gabapentin 300 mg capsule      cyclobenzaprine (FLEXERIL) 10 MG tablet cyclobenzaprine 10 mg tablet      ARIPiprazole (ABILIFY) 15 MG tablet Take 15 mg by mouth daily      omeprazole (PRILOSEC) 40 MG capsule Take 40 mg by mouth 2 times daily       Respiratory Therapy Supplies (NEBULIZER COMPRESSOR) KIT 1 kit by Does not apply route once for 1 dose 1 kit 0     No current facility-administered medications for this visit. Social History     Socioeconomic History    Marital status:      Spouse name: None    Number of children: None    Years of education: None    Highest education level: None   Occupational History    None   Social Needs    Financial resource strain: None    Food insecurity:     Worry: None     Inability: None    Transportation needs:     Medical: None     Non-medical: None   Tobacco Use    Smoking status: Current Every Day Smoker     Packs/day: 1.00     Types: Cigarettes    Smokeless tobacco: Never Used   Substance and Sexual Activity    Alcohol use: Yes     Alcohol/week: 0.0 - 0.6 oz    Drug use: None    Sexual activity: Yes     Partners: Male   Lifestyle    Physical activity:     Days per week: None     Minutes per session: None    Stress: None   Relationships    Social connections:     Talks on phone: None     Gets together: None     Attends [employer]ism service: None     Active member of club or organization: None     Attends meetings of clubs or organizations: None     Relationship status: None    Intimate partner violence:     Fear of current or ex partner: None     Emotionally abused: None     Physically abused: None     Forced sexual activity: None   Other Topics Concern    None   Social History Narrative    None       Objective:   Physical Exam   Constitutional: She is oriented to person, place, and time.  She appears well-developed and

## 2019-05-07 ENCOUNTER — ANESTHESIA EVENT (OUTPATIENT)
Dept: OPERATING ROOM | Age: 34
End: 2019-05-07
Payer: MEDICARE

## 2019-05-08 ENCOUNTER — HOSPITAL ENCOUNTER (OUTPATIENT)
Age: 34
Setting detail: OUTPATIENT SURGERY
Discharge: HOME | End: 2019-05-08
Attending: SURGERY | Admitting: SURGERY
Payer: MEDICARE

## 2019-05-08 ENCOUNTER — ANESTHESIA (OUTPATIENT)
Dept: OPERATING ROOM | Age: 34
End: 2019-05-08
Payer: MEDICARE

## 2019-05-08 VITALS
OXYGEN SATURATION: 97 % | HEIGHT: 66 IN | BODY MASS INDEX: 32.14 KG/M2 | RESPIRATION RATE: 18 BRPM | DIASTOLIC BLOOD PRESSURE: 65 MMHG | SYSTOLIC BLOOD PRESSURE: 111 MMHG | WEIGHT: 200 LBS | TEMPERATURE: 97.4 F | HEART RATE: 65 BPM

## 2019-05-08 VITALS
RESPIRATION RATE: 24 BRPM | OXYGEN SATURATION: 100 % | DIASTOLIC BLOOD PRESSURE: 55 MMHG | SYSTOLIC BLOOD PRESSURE: 98 MMHG

## 2019-05-08 PROBLEM — K21.00 GASTROESOPHAGEAL REFLUX DISEASE WITH ESOPHAGITIS: Status: ACTIVE | Noted: 2019-05-08

## 2019-05-08 PROCEDURE — 6360000002 HC RX W HCPCS: Performed by: NURSE ANESTHETIST, CERTIFIED REGISTERED

## 2019-05-08 PROCEDURE — 2709999900 HC NON-CHARGEABLE SUPPLY: Performed by: SURGERY

## 2019-05-08 PROCEDURE — 7100000011 HC PHASE II RECOVERY - ADDTL 15 MIN: Performed by: SURGERY

## 2019-05-08 PROCEDURE — 3609012400 HC EGD TRANSORAL BIOPSY SINGLE/MULTIPLE: Performed by: SURGERY

## 2019-05-08 PROCEDURE — 7100000010 HC PHASE II RECOVERY - FIRST 15 MIN: Performed by: SURGERY

## 2019-05-08 PROCEDURE — 87077 CULTURE AEROBIC IDENTIFY: CPT

## 2019-05-08 PROCEDURE — 2580000003 HC RX 258: Performed by: SURGERY

## 2019-05-08 PROCEDURE — 3700000000 HC ANESTHESIA ATTENDED CARE: Performed by: SURGERY

## 2019-05-08 PROCEDURE — 2500000003 HC RX 250 WO HCPCS: Performed by: NURSE ANESTHETIST, CERTIFIED REGISTERED

## 2019-05-08 PROCEDURE — 43239 EGD BIOPSY SINGLE/MULTIPLE: CPT | Performed by: SURGERY

## 2019-05-08 PROCEDURE — 3700000001 HC ADD 15 MINUTES (ANESTHESIA): Performed by: SURGERY

## 2019-05-08 PROCEDURE — 88305 TISSUE EXAM BY PATHOLOGIST: CPT

## 2019-05-08 RX ORDER — ONDANSETRON 2 MG/ML
4 INJECTION INTRAMUSCULAR; INTRAVENOUS EVERY 6 HOURS PRN
Status: CANCELLED | OUTPATIENT
Start: 2019-05-08

## 2019-05-08 RX ORDER — SODIUM CHLORIDE 0.9 % (FLUSH) 0.9 %
10 SYRINGE (ML) INJECTION EVERY 12 HOURS SCHEDULED
Status: DISCONTINUED | OUTPATIENT
Start: 2019-05-08 | End: 2019-05-08 | Stop reason: HOSPADM

## 2019-05-08 RX ORDER — SODIUM CHLORIDE, SODIUM LACTATE, POTASSIUM CHLORIDE, CALCIUM CHLORIDE 600; 310; 30; 20 MG/100ML; MG/100ML; MG/100ML; MG/100ML
INJECTION, SOLUTION INTRAVENOUS CONTINUOUS
Status: CANCELLED | OUTPATIENT
Start: 2019-05-08

## 2019-05-08 RX ORDER — SODIUM CHLORIDE, SODIUM LACTATE, POTASSIUM CHLORIDE, CALCIUM CHLORIDE 600; 310; 30; 20 MG/100ML; MG/100ML; MG/100ML; MG/100ML
INJECTION, SOLUTION INTRAVENOUS CONTINUOUS
Status: DISCONTINUED | OUTPATIENT
Start: 2019-05-08 | End: 2019-05-08 | Stop reason: HOSPADM

## 2019-05-08 RX ORDER — 0.9 % SODIUM CHLORIDE 0.9 %
10 VIAL (ML) INJECTION PRN
Status: DISCONTINUED | OUTPATIENT
Start: 2019-05-08 | End: 2019-05-08 | Stop reason: HOSPADM

## 2019-05-08 RX ORDER — SODIUM CHLORIDE 0.9 % (FLUSH) 0.9 %
10 SYRINGE (ML) INJECTION EVERY 12 HOURS SCHEDULED
Status: CANCELLED | OUTPATIENT
Start: 2019-05-08

## 2019-05-08 RX ORDER — SODIUM CHLORIDE 0.9 % (FLUSH) 0.9 %
10 SYRINGE (ML) INJECTION PRN
Status: CANCELLED | OUTPATIENT
Start: 2019-05-08

## 2019-05-08 RX ORDER — METOCLOPRAMIDE HYDROCHLORIDE 5 MG/ML
INJECTION INTRAMUSCULAR; INTRAVENOUS PRN
Status: DISCONTINUED | OUTPATIENT
Start: 2019-05-08 | End: 2019-05-08 | Stop reason: SDUPTHER

## 2019-05-08 RX ORDER — SODIUM CHLORIDE 0.9 % (FLUSH) 0.9 %
10 SYRINGE (ML) INJECTION PRN
Status: DISCONTINUED | OUTPATIENT
Start: 2019-05-08 | End: 2019-05-08 | Stop reason: HOSPADM

## 2019-05-08 RX ORDER — LIDOCAINE HYDROCHLORIDE 20 MG/ML
INJECTION, SOLUTION EPIDURAL; INFILTRATION; INTRACAUDAL; PERINEURAL PRN
Status: DISCONTINUED | OUTPATIENT
Start: 2019-05-08 | End: 2019-05-08 | Stop reason: SDUPTHER

## 2019-05-08 RX ORDER — PROPOFOL 10 MG/ML
INJECTION, EMULSION INTRAVENOUS PRN
Status: DISCONTINUED | OUTPATIENT
Start: 2019-05-08 | End: 2019-05-08 | Stop reason: SDUPTHER

## 2019-05-08 RX ADMIN — FAMOTIDINE 20 MG: 10 INJECTION, SOLUTION INTRAVENOUS at 09:15

## 2019-05-08 RX ADMIN — METOCLOPRAMIDE 10 MG: 5 INJECTION, SOLUTION INTRAMUSCULAR; INTRAVENOUS at 09:15

## 2019-05-08 RX ADMIN — LIDOCAINE HYDROCHLORIDE 100 MG: 20 INJECTION, SOLUTION EPIDURAL; INFILTRATION; INTRACAUDAL at 10:09

## 2019-05-08 RX ADMIN — PROPOFOL 100 MG: 10 INJECTION, EMULSION INTRAVENOUS at 10:11

## 2019-05-08 RX ADMIN — PROPOFOL 100 MG: 10 INJECTION, EMULSION INTRAVENOUS at 10:09

## 2019-05-08 RX ADMIN — PROPOFOL 30 MG: 10 INJECTION, EMULSION INTRAVENOUS at 10:14

## 2019-05-08 RX ADMIN — SODIUM CHLORIDE, POTASSIUM CHLORIDE, SODIUM LACTATE AND CALCIUM CHLORIDE: 600; 310; 30; 20 INJECTION, SOLUTION INTRAVENOUS at 08:43

## 2019-05-08 ASSESSMENT — PAIN SCALES - GENERAL
PAINLEVEL_OUTOF10: 0
PAINLEVEL_OUTOF10: 0

## 2019-05-08 ASSESSMENT — ENCOUNTER SYMPTOMS: STRIDOR: 0

## 2019-05-08 ASSESSMENT — LIFESTYLE VARIABLES: SMOKING_STATUS: 1

## 2019-05-08 ASSESSMENT — PAIN - FUNCTIONAL ASSESSMENT: PAIN_FUNCTIONAL_ASSESSMENT: 0-10

## 2019-05-08 NOTE — ANESTHESIA PRE PROCEDURE
Department of Anesthesiology  Preprocedure Note       Name:  Katerina Jaramillo   Age:  29 y.o.  :  1985                                          MRN:  [id number]         Date:  2019      Surgeon: Ed Mujica):  Dave Schneider MD    Procedure: EGD ESOPHAGOGASTRODUODENOSCOPY (N/A )    Medications prior to admission:   Prior to Admission medications    Medication Sig Start Date End Date Taking? Authorizing Provider   gabapentin (NEURONTIN) 300 MG capsule gabapentin 300 mg capsule 19  Yes Historical Provider, MD   cyclobenzaprine (FLEXERIL) 10 MG tablet cyclobenzaprine 10 mg tablet   Yes Historical Provider, MD   sucralfate (CARAFATE) 1 GM/10ML suspension Take 10 mLs by mouth 4 times daily 19  Yes Dave Schneider MD   ARIPiprazole (ABILIFY) 15 MG tablet Take 15 mg by mouth daily   Yes Historical Provider, MD   omeprazole (PRILOSEC) 40 MG capsule Take 40 mg by mouth 2 times daily  2/16/15  Yes Historical Provider, MD   Respiratory Therapy Supplies (NEBULIZER COMPRESSOR) KIT 1 kit by Does not apply route once for 1 dose 1/15/19 1/15/19  Greg Sen PA-C       Current medications:    Current Facility-Administered Medications   Medication Dose Route Frequency Provider Last Rate Last Dose    lactated ringers infusion   Intravenous Continuous Dave Schneider MD        sodium chloride flush 0.9 % injection 10 mL  10 mL Intravenous 2 times per day Dave Schneider MD        sodium chloride flush 0.9 % injection 10 mL  10 mL Intravenous PRN Dave Schneider MD        lactated ringers infusion   Intravenous Continuous Dave Schneider  mL/hr at 19 0843      sodium chloride (PF) 0.9 % injection 10 mL  10 mL Intravenous PRN Dave Schneider MD           Allergies: Allergies   Allergen Reactions    Keflex [Cephalexin] Nausea Only    Flagyl [Metronidazole] Nausea And Vomiting       Problem List:  There is no problem list on file for this patient.       Past Medical History:        Diagnosis 04/19/2018    ALKPHOS 76 04/19/2018    AST 15 04/19/2018    ALT 10 04/19/2018       POC Tests: No results for input(s): POCGLU, POCNA, POCK, POCCL, POCBUN, POCHEMO, POCHCT in the last 72 hours. Coags:   Lab Results   Component Value Date    PROTIME 9.9 04/19/2018    INR 0.9 04/19/2018       HCG (If Applicable):   Lab Results   Component Value Date    PREGTESTUR NEGATIVE 04/16/2018        ABGs: No results found for: PHART, PO2ART, FFN6VKC, EIK4NIO, BEART, D6EVBYJS     Type & Screen (If Applicable):  No results found for: LABABO, [address]    Anesthesia Evaluation  Patient summary reviewed and Nursing notes reviewed no history of anesthetic complications:   Airway: Mallampati: I  TM distance: <3 FB   Neck ROM: full  Mouth opening: > = 3 FB Dental: normal exam         Pulmonary: breath sounds clear to auscultation  (+) current smoker (1 ppd)    (-) stridor          Patient smoked on day of surgery. Cardiovascular:  Exercise tolerance: good (>4 METS),         ECG reviewed  Rhythm: regular  Rate: normal                    Neuro/Psych:   (+) depression/anxiety              ROS comment: Low back pain GI/Hepatic/Renal:   (+) hiatal hernia, GERD: poorly controlled,           Endo/Other: Negative Endo/Other ROS                    Abdominal:       Abdomen: soft. Vascular: negative vascular ROS. Anesthesia Plan      general and TIVA     ASA 2             Anesthetic plan and risks discussed with patient.                       Zhao Liu, APRN - CRNA   5/8/2019

## 2019-05-08 NOTE — BRIEF OP NOTE
Brief Postoperative Note  ______________________________________________________________    Patient: Katerina Jaramillo  YOB: 1985  MRN: [id number]  Date of Procedure: 5/8/2019    Pre-Op Diagnosis:      1. Reflux symptoms     2. History PUD    Post-Op Diagnosis:     1. Small hiatal hernia     2. Mild distal esophagitis     3. Mild antral gastritis       Procedure(s):     1. EGD     2. Distal esophageal biopsies     3. Antral biopsies    Anesthesia: General, TIVA    Surgeon(s):  Dave Schneider MD    Assistant:      Estimated Blood Loss (mL): less than 88JC     Complications: None    Specimens:   ID Type Source Tests Collected by Time Destination   A :  Tissue Stomach SURGICAL PATHOLOGY Dave Schneider MD 5/8/2019 1011    B :  Tissue Stomach SURGICAL PATHOLOGY, H. PYLORI DETECTION Dave Schneider MD 5/8/2019 1014    C :  Tissue GE Junction Biopsy SURGICAL PATHOLOGY Dave Schneider MD 5/8/2019 1015        Findings:   As above. Continue PPI, weight reduction.     Dictated # [id number]    Dave Schneider MD  Date: 5/8/2019  Time: 10:17 AM

## 2019-05-08 NOTE — PROGRESS NOTES
Discharge instructions given to pt and visitor. Discharge Criteria    Inpatients must meet Criteria 1 through 7. All other patients are either YES or N/A. If a NO is chosen then Anesthesia or Surgeon must be notified. 1.  Minimum 30 minutes after last dose of sedative medication, minimum 120 minutes after last dose of reversal agent. Yes      2. Systolic BP stable within 20 mmHg for 30 minutes & systolic BP between 90 & 762 or within 10 mmHg of baseline. Yes      3. Pulse between 60 and 100 or within 10 bpm of baseline. Yes      4. Spontaneous respiratory rate >/= 10 per minute. Yes      5. SaO2 >/= 95 or  >/= baseline. Yes      6. Able to cough and swallow or return to baseline function. Yes      7. Alert and oriented or return to baseline mental status. Yes      8. Demonstrates controlled, coordinated movements, ambulates with steady gait, or return to baseline activity function. Yes      9. Minimal or no pain or nausea, or at a level tolerable and acceptable to patient. Yes      10. Takes and retains oral fluids as allowed. Yes      11. Procedural / perioperative site stable. Minimal or no bleeding. Yes          12. If GI endoscopy procedure, minimal or no abdominal distention or passing flatus. Yes      13. Written discharge instructions and emergency telephone number provided. Yes      14. Accompanied by a responsible adult. Yes      Adult patient discharged from facility without responsible person meets above criteria plus the following:   a) remains awake without stimulus for 30 minutes     b) oriented appropriate for age      c) all vital signs stable   d) no significant risk of losing protective reflexes      e) able to maintain pre-procedure mobility without assistance   f) no nausea or dizziness      g) transportation arrangements that do not require patient to operate motor Vehicle.      N/A

## 2019-05-08 NOTE — OP NOTE
[address] [place], [place] [address]                                OPERATIVE REPORT    PATIENT NAME: [place] Gómez                     :        1985  MED REC NO:   [id number]                              ROOM:  ACCOUNT NO:   [de-identified]                           ADMIT DATE: 2019  PROVIDER:     Luanne Gamino    DATE OF PROCEDURE:  2019    ATTENDING SURGEON:  Dr. Luanne Gamino. PRIMARY CARE PROVIDER:  Melany Ocasio. PREOPERATIVE DIAGNOSIS:  1. Reflux symptoms. 2.  History of peptic ulcer disease. POSTOPERATIVE DIAGNOSES:  1.  Small hiatal hernia. 2.  Mild distal esophagitis. 3.  Mild antral gastritis. PROCEDURE PERFORMED:  1. Esophagogastroduodenoscopy. 2.  Distal esophageal biopsies. 3.  Prepyloric antral biopsies. ANESTHESIA:  MAC.    ESTIMATED BLOOD LOSS:  Less than 20 mL. INTRAOPERATIVE FINDINGS:  As mentioned above. Good post-biopsy  hemostasis. INDICATIONS:  The patient is a 44-year-old white female with a long  history of abdominal cramping, chronic constipation, reflux symptoms,  EGD and colonoscopy over five years ago in [place], history of stomach  ulcer. Gallbladder ultrasound normal in 2017. At this time, diagnostic  endoscopy is indicated. DESCRIPTION OF PROCEDURE:  After obtaining an informed consent with  discussion of risks, benefits, and alternatives including a remote risk  of GI perforation and missed lesions, the patient was taken to the  endoscopy suite and placed in the left lateral recumbent position. Following adequate IV sedation, an endoscope was passed over the tongue  into the posterior oropharynx. Vocal folds were visualized and appeared  normal.  The scope was directed into the esophagus and down to the GE  junction. Upper, mid, and lower esophagus all appeared normal with the  exception of a small hiatal hernia and mild distal esophagitis.    Four-quadrant esophageal biopsies were obtained. There were no rings,  no webs, no strictures, no varices. The stomach was entered, had normal  distensibility. On retroflexion, the fundus and cardia appeared normal.  The body and prepyloric antrum had very mild gastritis without  ulceration. Prepyloric antral biopsies were obtained. The pylorus was  patent. The duodenum was entered. The first, second, and third  portions appeared normal.  The stomach was decompressed by suction as  the scope was removed. The patient tolerated the procedure well and was  transferred to PACU in stable condition. SPECIMENS:  1. Prepyloric antral biopsies. 2.  Distal esophageal biopsies. COMPLICATIONS:  None. DISPOSITION:  To PACU awake, alert, and stable. Following recovery, we  will discharge the patient home with gradual advancement of diet and  activity as tolerated with instructions for a high-fiber, low-fat diet  with fiber supplementation. We will continue proton pump inhibition. Followup will be with me in one to two weeks.         CARLITA Smith    D: 05/08/2019 10:22:48       T: 05/08/2019 12:34:15     [email or web address]  Job#: [id number]     Doc#: [id number]    CC:  Lisa Llanos

## 2019-05-09 LAB
DIRECT EXAM: NEGATIVE
Lab: NORMAL
SPECIMEN DESCRIPTION: NORMAL
SURGICAL PATHOLOGY REPORT: NORMAL

## 2019-06-04 ENCOUNTER — OFFICE VISIT (OUTPATIENT)
Dept: SURGERY | Age: 34
End: 2019-06-04
Payer: MEDICARE

## 2019-06-04 VITALS
DIASTOLIC BLOOD PRESSURE: 71 MMHG | WEIGHT: 202.2 LBS | TEMPERATURE: 97 F | HEART RATE: 76 BPM | HEIGHT: 67 IN | SYSTOLIC BLOOD PRESSURE: 132 MMHG | BODY MASS INDEX: 31.74 KG/M2 | RESPIRATION RATE: 20 BRPM

## 2019-06-04 DIAGNOSIS — K59.09 CHRONIC CONSTIPATION: ICD-10-CM

## 2019-06-04 DIAGNOSIS — Z98.890 S/P ENDOSCOPY: Primary | ICD-10-CM

## 2019-06-04 DIAGNOSIS — K44.9 HIATAL HERNIA: ICD-10-CM

## 2019-06-04 DIAGNOSIS — K29.30 CHRONIC SUPERFICIAL GASTRITIS WITHOUT BLEEDING: ICD-10-CM

## 2019-06-04 DIAGNOSIS — K21.9 GASTROESOPHAGEAL REFLUX DISEASE, ESOPHAGITIS PRESENCE NOT SPECIFIED: ICD-10-CM

## 2019-06-04 PROCEDURE — 4004F PT TOBACCO SCREEN RCVD TLK: CPT | Performed by: SURGERY

## 2019-06-04 PROCEDURE — 99212 OFFICE O/P EST SF 10 MIN: CPT | Performed by: SURGERY

## 2019-06-04 PROCEDURE — G8428 CUR MEDS NOT DOCUMENT: HCPCS | Performed by: SURGERY

## 2019-06-04 PROCEDURE — G8417 CALC BMI ABV UP PARAM F/U: HCPCS | Performed by: SURGERY

## 2019-06-21 ASSESSMENT — ENCOUNTER SYMPTOMS
BACK PAIN: 0
CHOKING: 0
ABDOMINAL DISTENTION: 1
SHORTNESS OF BREATH: 0
TROUBLE SWALLOWING: 0
SORE THROAT: 0
BLOOD IN STOOL: 0
NAUSEA: 0
COUGH: 0
VOMITING: 0
CONSTIPATION: 1
ABDOMINAL PAIN: 1

## 2019-06-21 NOTE — PROGRESS NOTES
Subjective:      Patient ID: Sandy Lamas is a 29 y.o. female. HPI     Ms Michaela Farris returns for follow up after EGD May 8, 2019.     PREOPERATIVE DIAGNOSIS:  1. Reflux symptoms. 2.  History of peptic ulcer disease.     POSTOPERATIVE DIAGNOSES:  1.  Small hiatal hernia. 2.  Mild distal esophagitis. 3.  Mild antral gastritis.     PROCEDURE PERFORMED:  1. Esophagogastroduodenoscopy. 2.  Distal esophageal biopsies. 3.  Prepyloric antral biopsies. She is doing fairly well. Symptoms are improving. No new complaints. Review of Systems   Constitutional: Negative for activity change, appetite change, chills, fever and unexpected weight change. HENT: Negative for nosebleeds, sneezing, sore throat and trouble swallowing. Eyes: Negative for visual disturbance. Respiratory: Negative for cough, choking and shortness of breath. Cardiovascular: Negative for chest pain, palpitations and leg swelling. Gastrointestinal: Positive for abdominal distention, abdominal pain (GERD) and constipation. Negative for blood in stool, nausea and vomiting. Genitourinary: Negative for dysuria, flank pain and hematuria. Musculoskeletal: Positive for arthralgias. Negative for back pain, gait problem and myalgias. Allergic/Immunologic: Negative for immunocompromised state. Neurological: Negative for dizziness, seizures, syncope, weakness and headaches. Hematological: Does not bruise/bleed easily. Psychiatric/Behavioral: Negative for confusion and sleep disturbance.         Past Medical History:   Diagnosis Date    Bipolar affective disorder ([facility] [address]     Depression     GERD (gastroesophageal reflux disease)     Hiatal hernia     Irritable bowel syndrome     Low back pain        Past Surgical History:   Procedure Laterality Date     SECTION      HYSTERECTOMY      STERILIZATION  2012    [place]    UPPER GASTROINTESTINAL ENDOSCOPY N/A 2019    EGD BIOPSY and clotest performed by Jose C Contreras MD at [facility]       No family history on file. Allergies:  See list    Current Outpatient Medications   Medication Sig Dispense Refill    cyclobenzaprine (FLEXERIL) 10 MG tablet cyclobenzaprine 10 mg tablet      sucralfate (CARAFATE) 1 GM/10ML suspension Take 10 mLs by mouth 4 times daily 420 mL 1    ARIPiprazole (ABILIFY) 15 MG tablet Take 15 mg by mouth daily      omeprazole (PRILOSEC) 40 MG capsule Take 40 mg by mouth 2 times daily       gabapentin (NEURONTIN) 300 MG capsule gabapentin 300 mg capsule      Respiratory Therapy Supplies (NEBULIZER COMPRESSOR) KIT 1 kit by Does not apply route once for 1 dose 1 kit 0     No current facility-administered medications for this visit. Social History     Socioeconomic History    Marital status:      Spouse name: None    Number of children: None    Years of education: None    Highest education level: None   Occupational History    None   Social Needs    Financial resource strain: None    Food insecurity:     Worry: None     Inability: None    Transportation needs:     Medical: None     Non-medical: None   Tobacco Use    Smoking status: Current Every Day Smoker     Packs/day: 0.50     Types: Cigarettes    Smokeless tobacco: Never Used   Substance and Sexual Activity    Alcohol use:  Yes     Alcohol/week: 0.0 - 0.6 oz    Drug use: None    Sexual activity: Yes     Partners: Male   Lifestyle    Physical activity:     Days per week: None     Minutes per session: None    Stress: None   Relationships    Social connections:     Talks on phone: None     Gets together: None     Attends Baptism service: None     Active member of club or organization: None     Attends meetings of clubs or organizations: None     Relationship status: None    Intimate partner violence:     Fear of current or ex partner: None     Emotionally abused: None     Physically abused: None     Forced sexual activity: None   Other Topics Concern    None   Social History Narrative    None       Objective:   Physical Exam   Constitutional: She is oriented to person, place, and time. She appears well-developed and well-nourished. No distress. HENT:   Head: Normocephalic and atraumatic. Eyes: Pupils are equal, round, and reactive to light. Conjunctivae are normal. No scleral icterus. Neck: No tracheal deviation present. Cardiovascular: Normal rate. Pulmonary/Chest: Effort normal. No respiratory distress. Musculoskeletal: She exhibits no edema. Neurological: She is alert and oriented to person, place, and time. Skin: Skin is warm and dry. Psychiatric: She has a normal mood and affect. Her behavior is normal. Judgment and thought content normal.   Nursing note and vitals reviewed. 5/9/2019  2:10 PM - Alex, Tee Incoming Lab Results From [facility]     Component Collected Lab   Surgical Pathology Report 05/08/2019  3:06  [facility]   [address],  [address] [place], [address]   [phone]   Fax: [phone]     4 [facility]       Patient Name: Yossi Torres: [id number]   Path Number: [id number]   Collected: 5/8/2019   Received: 5/8/2019   Reported: 5/9/2019 14:09     -- Diagnosis --   1. STOMACH, ANTRUM, BIOPSIES:        -  MINIMAL CHRONIC INFLAMMATION.      -  NEGATIVE FOR ACTIVE GASTRITIS, INTESTINAL METAPLASIA OR   DYSPLASIA. 2. GASTROESOPHAGEAL JUNCTION, BIOPSIES:             -  SQUAMOUS AND GASTRIC-TYPE MUCOSA WITH MILD CHRONIC   INACTIVE CARDITIS.      -  NEGATIVE FOR INTRAMUCOSAL EOSINOPHILS, INTESTINAL METAPLASIA   OR DYSPLASIA. Nae Hawkins M.D.   **Electronically Signed Out**         jet/5/9/2019         Clinical Information   Pre-op Diagnosis:  EPIGASTRIC PAIN, GERD     Operative Findings:  ANTRAL BIOPSIES; STOMACH CLOTEST (H. PYLORI   DETECTION); GE JUNCTION BIOPSIES   Operation Performed:  EGD, ESOPHAGOGASTRODUODENOSCOPY     Source of Specimen   1: ANTRAL BIOPSIES   2: GE JUNCTION BIOPSIES     Gross Description   1.  \"STEVO BERNSTEIN, ANTRAL BIOPSIES\" Three tan tissue fragments from   0.2 to 0.4 cm.  Entirely 1cs. 2.  \"STEVO BERNSTEIN, GE JUNCTION BIOPSIES\" Four tan-white tissue   fragments from 0.2 to 0.3 cm.  Entirely 1cs.  tm       Microscopic Description   1. Sections of gastric mucosa show lamina propria with minimal   lymphocytic inflammation. Raymundo Osier is no evidence of acute inflammation,   intestinal metaplasia or dysplasia. Raymundo Osier is no evidence of organisms   suspicious for Helicobacter on the routine H&E stain. 2. Sections of squamous and gastric-type glandular mucosa show   increased lymphocytes and plasma cells in the lamina propria. There is   no evidence of basal layer hyperplasia, intramucosal eosinophils,   intestinal metaplasia or dysplasia. Testing Performed By     Mali Westbrook Name Director Address Valid Date Range   [address] [address] 08/30/17 0801-Present   Lab and Collection     Surgical Pathology - 5/8/2019       Assessment:       Diagnosis Orders   1. S/P endoscopy     2. Chronic superficial gastritis without bleeding     3. Hiatal hernia     4. Gastroesophageal reflux disease, esophagitis presence not specified     5. Chronic constipation           Plan:      Ms Dia Subramanian is symptomatically improved. Reviewed EGD findings and pathology. Continue PPI, healthy diet.   Discussed importance of complete tobacco cessation, adding supplemental fiber, and appropriate use of Milk of Aundrea Naranjo MD

## 2019-06-21 NOTE — PATIENT INSTRUCTIONS
Patient Education        Gastritis: Care Instructions  Your Care Instructions    Gastritis is a sore and upset stomach. It happens when something irritates the stomach lining. Many things can cause it. These include an infection such as the flu or something you ate or drank. Medicines or a sore on the lining of the stomach (ulcer) also can cause it. Your belly may bloat and ache. You may belch, vomit, and feel sick to your stomach. You should be able to relieve the problem by taking medicine. And it may help to change your diet. If gastritis lasts, your doctor may prescribe medicine. Follow-up care is a key part of your treatment and safety. Be sure to make and go to all appointments, and call your doctor if you are having problems. It's also a good idea to know your test results and keep a list of the medicines you take. How can you care for yourself at home? · If your doctor prescribed antibiotics, take them as directed. Do not stop taking them just because you feel better. You need to take the full course of antibiotics. · Be safe with medicines. If your doctor prescribed medicine to decrease stomach acid, take it as directed. Call your doctor if you think you are having a problem with your medicine. · Do not take any other medicine, including over-the-counter pain relievers, without talking to your doctor first.  · If your doctor recommends over-the-counter medicine to reduce stomach acid, such as Pepcid AC, Prilosec, Tagamet HB, or Zantac 75, follow the directions on the label. · Drink plenty of fluids (enough so that your urine is light yellow or clear like water) to prevent dehydration. Choose water and other caffeine-free clear liquids. If you have kidney, heart, or liver disease and have to limit fluids, talk with your doctor before you increase the amount of fluids you drink. · Limit how much alcohol you drink. · Avoid coffee, tea, cola drinks, chocolate, and other foods with caffeine.  They antidepressants. Tell your doctor about all the medicines you take. Your doctor may want to make a medicine change to ease your symptoms. Follow-up care is a key part of your treatment and safety. Be sure to make and go to all appointments, and call your doctor if you are having problems. It's also a good idea to know your test results and keep a list of the medicines you take. How can you care for yourself at home? · Drink plenty of fluids, enough so that your urine is light yellow or clear like water. If you have kidney, heart, or liver disease and have to limit fluids, talk with your doctor before you increase the amount of fluids you drink. · Include high-fiber foods in your diet each day. These include fruits, vegetables, beans, and whole grains. · Get at least 30 minutes of exercise on most days of the week. Walking is a good choice. You also may want to do other activities, such as running, swimming, cycling, or playing tennis or team sports. · Take a fiber supplement, such as Citrucel or Metamucil, every day. Read and follow all instructions on the label. · Schedule time each day for a bowel movement. A daily routine may help. Take your time having your bowel movement. · Support your feet with a small step stool when you sit on the toilet. This helps flex your hips and places your pelvis in a squatting position. · Your doctor may recommend an over-the-counter laxative to relieve your constipation. Examples are Milk of Magnesia and MiraLax. Read and follow all instructions on the label. Do not use laxatives on a long-term basis. When should you call for help? Call your doctor now or seek immediate medical care if:    · You have new or worse belly pain.     · You have new or worse nausea or vomiting.     · You have blood in your stools.    Watch closely for changes in your health, and be sure to contact your doctor if:    · Your constipation is getting worse.     · You do not get better as expected. Where can you learn more? Go to https://chpepiceweb.Twist. org and sign in to your Go800hart account. Enter 21 [phone] in the [facility] box to learn more about \"Constipation: Care Instructions. \"     If you do not have an account, please click on the \"Sign Up Now\" link. Current as of: September 23, 2018  Content Version: 12.0  © 4781-8199 [employer]wise, Incorporated. Care instructions adapted under license by [facility] ([facility]). If you have questions about a medical condition or this instruction, always ask your healthcare professional. [address] any warranty or liability for your use of this information.

## 2019-09-20 ENCOUNTER — TELEPHONE (OUTPATIENT)
Dept: SURGERY | Age: 34
End: 2019-09-20

## 2019-09-23 RX ORDER — PANTOPRAZOLE SODIUM 40 MG/1
40 TABLET, DELAYED RELEASE ORAL DAILY
Qty: 30 TABLET | Refills: 3 | Status: SHIPPED | OUTPATIENT
Start: 2019-09-23 | End: 2020-01-13

## 2019-10-26 ENCOUNTER — APPOINTMENT (OUTPATIENT)
Dept: CT IMAGING | Age: 34
End: 2019-10-26
Payer: MEDICARE

## 2019-10-26 ENCOUNTER — HOSPITAL ENCOUNTER (EMERGENCY)
Age: 34
Discharge: HOME | End: 2019-10-26
Attending: EMERGENCY MEDICINE
Payer: MEDICARE

## 2019-10-26 VITALS
DIASTOLIC BLOOD PRESSURE: 63 MMHG | SYSTOLIC BLOOD PRESSURE: 106 MMHG | TEMPERATURE: 97.9 F | OXYGEN SATURATION: 96 % | RESPIRATION RATE: 16 BRPM | HEART RATE: 63 BPM

## 2019-10-26 DIAGNOSIS — R10.31 ABDOMINAL PAIN, RIGHT LOWER QUADRANT: Primary | ICD-10-CM

## 2019-10-26 LAB
-: NORMAL
ABSOLUTE EOS #: 0.12 K/UL (ref 0–0.44)
ABSOLUTE IMMATURE GRANULOCYTE: 0.03 K/UL (ref 0–0.3)
ABSOLUTE LYMPH #: 2.42 K/UL (ref 1.1–3.7)
ABSOLUTE MONO #: 0.61 K/UL (ref 0.1–1.2)
ALBUMIN SERPL-MCNC: 4.3 G/DL (ref 3.5–5.2)
ALBUMIN/GLOBULIN RATIO: 1.3 (ref 1–2.5)
ALP BLD-CCNC: 60 U/L (ref 35–104)
ALT SERPL-CCNC: 8 U/L (ref 5–33)
AMORPHOUS: NORMAL
ANION GAP SERPL CALCULATED.3IONS-SCNC: 11 MMOL/L (ref 9–17)
AST SERPL-CCNC: 13 U/L
BACTERIA: NORMAL
BASOPHILS # BLD: 0 % (ref 0–2)
BASOPHILS ABSOLUTE: 0.04 K/UL (ref 0–0.2)
BILIRUB SERPL-MCNC: 0.27 MG/DL (ref 0.3–1.2)
BILIRUBIN URINE: NEGATIVE
BUN BLDV-MCNC: 8 MG/DL (ref 6–20)
BUN/CREAT BLD: 14 (ref 9–20)
CALCIUM SERPL-MCNC: 9.5 MG/DL (ref 8.6–10.4)
CASTS UA: NORMAL /LPF
CHLORIDE BLD-SCNC: 100 MMOL/L (ref 98–107)
CO2: 22 MMOL/L (ref 20–31)
COLOR: YELLOW
COMMENT UA: ABNORMAL
CREAT SERPL-MCNC: 0.57 MG/DL (ref 0.5–0.9)
CRYSTALS, UA: NORMAL /HPF
DIFFERENTIAL TYPE: ABNORMAL
EOSINOPHILS RELATIVE PERCENT: 1 % (ref 1–4)
EPITHELIAL CELLS UA: NORMAL /HPF (ref 0–25)
GFR AFRICAN AMERICAN: >60 ML/MIN
GFR NON-AFRICAN AMERICAN: >60 ML/MIN
GFR SERPL CREATININE-BSD FRML MDRD: ABNORMAL ML/MIN/{1.73_M2}
GFR SERPL CREATININE-BSD FRML MDRD: ABNORMAL ML/MIN/{1.73_M2}
GLUCOSE BLD-MCNC: 92 MG/DL (ref 70–99)
GLUCOSE URINE: NEGATIVE
HCT VFR BLD CALC: 40.6 % (ref 36.3–47.1)
HEMOGLOBIN: 12.9 G/DL (ref 11.9–15.1)
IMMATURE GRANULOCYTES: 0 %
KETONES, URINE: NEGATIVE
LEUKOCYTE ESTERASE, URINE: NEGATIVE
LIPASE: 28 U/L (ref 13–60)
LYMPHOCYTES # BLD: 25 % (ref 24–43)
MCH RBC QN AUTO: 28.8 PG (ref 25.2–33.5)
MCHC RBC AUTO-ENTMCNC: 31.8 G/DL (ref 28.4–34.8)
MCV RBC AUTO: 90.6 FL (ref 82.6–102.9)
MONOCYTES # BLD: 6 % (ref 3–12)
MUCUS: NORMAL
NITRITE, URINE: NEGATIVE
NRBC AUTOMATED: 0 PER 100 WBC
OTHER OBSERVATIONS UA: NORMAL
PDW BLD-RTO: 16.7 % (ref 11.8–14.4)
PH UA: 7 (ref 5–9)
PLATELET # BLD: 287 K/UL (ref 138–453)
PLATELET ESTIMATE: ABNORMAL
PMV BLD AUTO: 10.6 FL (ref 8.1–13.5)
POTASSIUM SERPL-SCNC: 4.2 MMOL/L (ref 3.7–5.3)
PROTEIN UA: NEGATIVE
RBC # BLD: 4.48 M/UL (ref 3.95–5.11)
RBC # BLD: ABNORMAL 10*6/UL
RBC UA: NORMAL /HPF (ref 0–2)
RENAL EPITHELIAL, UA: NORMAL /HPF
SEG NEUTROPHILS: 68 % (ref 36–65)
SEGMENTED NEUTROPHILS ABSOLUTE COUNT: 6.6 K/UL (ref 1.5–8.1)
SODIUM BLD-SCNC: 133 MMOL/L (ref 135–144)
SPECIFIC GRAVITY UA: 1.01 (ref 1.01–1.02)
TOTAL PROTEIN: 7.5 G/DL (ref 6.4–8.3)
TRICHOMONAS: NORMAL
TURBIDITY: CLEAR
URINE HGB: ABNORMAL
UROBILINOGEN, URINE: NORMAL
WBC # BLD: 9.8 K/UL (ref 3.5–11.3)
WBC # BLD: ABNORMAL 10*3/UL
WBC UA: NORMAL /HPF (ref 0–5)
YEAST: NORMAL

## 2019-10-26 PROCEDURE — 2580000003 HC RX 258: Performed by: EMERGENCY MEDICINE

## 2019-10-26 PROCEDURE — 36415 COLL VENOUS BLD VENIPUNCTURE: CPT

## 2019-10-26 PROCEDURE — 83690 ASSAY OF LIPASE: CPT

## 2019-10-26 PROCEDURE — 96374 THER/PROPH/DIAG INJ IV PUSH: CPT

## 2019-10-26 PROCEDURE — 80053 COMPREHEN METABOLIC PANEL: CPT

## 2019-10-26 PROCEDURE — 74177 CT ABD & PELVIS W/CONTRAST: CPT

## 2019-10-26 PROCEDURE — 6360000004 HC RX CONTRAST MEDICATION: Performed by: EMERGENCY MEDICINE

## 2019-10-26 PROCEDURE — 81001 URINALYSIS AUTO W/SCOPE: CPT

## 2019-10-26 PROCEDURE — 99284 EMERGENCY DEPT VISIT MOD MDM: CPT

## 2019-10-26 PROCEDURE — 85025 COMPLETE CBC W/AUTO DIFF WBC: CPT

## 2019-10-26 PROCEDURE — 96375 TX/PRO/DX INJ NEW DRUG ADDON: CPT

## 2019-10-26 PROCEDURE — 6360000002 HC RX W HCPCS: Performed by: EMERGENCY MEDICINE

## 2019-10-26 RX ORDER — 0.9 % SODIUM CHLORIDE 0.9 %
1000 INTRAVENOUS SOLUTION INTRAVENOUS ONCE
Status: COMPLETED | OUTPATIENT
Start: 2019-10-26 | End: 2019-10-26

## 2019-10-26 RX ORDER — KETOROLAC TROMETHAMINE 15 MG/ML
15 INJECTION, SOLUTION INTRAMUSCULAR; INTRAVENOUS ONCE
Status: COMPLETED | OUTPATIENT
Start: 2019-10-26 | End: 2019-10-26

## 2019-10-26 RX ORDER — ONDANSETRON 2 MG/ML
4 INJECTION INTRAMUSCULAR; INTRAVENOUS ONCE
Status: COMPLETED | OUTPATIENT
Start: 2019-10-26 | End: 2019-10-26

## 2019-10-26 RX ADMIN — IOPAMIDOL 75 ML: 755 INJECTION, SOLUTION INTRAVENOUS at 09:50

## 2019-10-26 RX ADMIN — SODIUM CHLORIDE 1000 ML: 9 INJECTION, SOLUTION INTRAVENOUS at 09:07

## 2019-10-26 RX ADMIN — ONDANSETRON 4 MG: 2 INJECTION INTRAMUSCULAR; INTRAVENOUS at 09:08

## 2019-10-26 RX ADMIN — KETOROLAC TROMETHAMINE 15 MG: 15 INJECTION, SOLUTION INTRAMUSCULAR; INTRAVENOUS at 09:08

## 2019-10-26 ASSESSMENT — PAIN SCALES - GENERAL
PAINLEVEL_OUTOF10: 8
PAINLEVEL_OUTOF10: 8

## 2019-10-26 ASSESSMENT — PAIN DESCRIPTION - LOCATION: LOCATION: ABDOMEN

## 2019-10-26 ASSESSMENT — PAIN DESCRIPTION - ORIENTATION: ORIENTATION: LOWER

## 2019-10-26 ASSESSMENT — PAIN DESCRIPTION - PAIN TYPE: TYPE: ACUTE PAIN

## 2019-10-26 ASSESSMENT — ENCOUNTER SYMPTOMS
SHORTNESS OF BREATH: 0
EYE PAIN: 0
ABDOMINAL PAIN: 1

## 2019-10-26 ASSESSMENT — PAIN DESCRIPTION - DESCRIPTORS: DESCRIPTORS: DISCOMFORT;ACHING

## 2019-10-26 ASSESSMENT — PAIN DESCRIPTION - FREQUENCY: FREQUENCY: CONTINUOUS

## 2019-10-31 ENCOUNTER — OFFICE VISIT (OUTPATIENT)
Dept: SURGERY | Age: 34
End: 2019-10-31
Payer: MEDICARE

## 2019-10-31 VITALS
BODY MASS INDEX: 29.19 KG/M2 | HEART RATE: 68 BPM | HEIGHT: 67 IN | RESPIRATION RATE: 16 BRPM | SYSTOLIC BLOOD PRESSURE: 120 MMHG | WEIGHT: 186 LBS | DIASTOLIC BLOOD PRESSURE: 74 MMHG

## 2019-10-31 DIAGNOSIS — R10.30 LOWER ABDOMINAL PAIN: Primary | ICD-10-CM

## 2019-10-31 DIAGNOSIS — K21.9 GASTROESOPHAGEAL REFLUX DISEASE, ESOPHAGITIS PRESENCE NOT SPECIFIED: ICD-10-CM

## 2019-10-31 DIAGNOSIS — K29.30 CHRONIC SUPERFICIAL GASTRITIS WITHOUT BLEEDING: ICD-10-CM

## 2019-10-31 DIAGNOSIS — K59.09 CHRONIC CONSTIPATION: ICD-10-CM

## 2019-10-31 DIAGNOSIS — K44.9 HIATAL HERNIA: ICD-10-CM

## 2019-10-31 PROCEDURE — 99213 OFFICE O/P EST LOW 20 MIN: CPT | Performed by: SURGERY

## 2019-10-31 PROCEDURE — G8484 FLU IMMUNIZE NO ADMIN: HCPCS | Performed by: SURGERY

## 2019-10-31 PROCEDURE — G8427 DOCREV CUR MEDS BY ELIG CLIN: HCPCS | Performed by: SURGERY

## 2019-10-31 PROCEDURE — 4004F PT TOBACCO SCREEN RCVD TLK: CPT | Performed by: SURGERY

## 2019-10-31 PROCEDURE — G8417 CALC BMI ABV UP PARAM F/U: HCPCS | Performed by: SURGERY

## 2019-10-31 RX ORDER — DICYCLOMINE HYDROCHLORIDE 10 MG/1
10 CAPSULE ORAL 4 TIMES DAILY
Qty: 120 CAPSULE | Refills: 3 | Status: SHIPPED | OUTPATIENT
Start: 2019-10-31 | End: 2021-01-04 | Stop reason: ALTCHOICE

## 2019-10-31 RX ORDER — SUCRALFATE 1 G/1
1 TABLET ORAL 4 TIMES DAILY
Qty: 120 TABLET | Refills: 3 | Status: SHIPPED | OUTPATIENT
Start: 2019-10-31 | End: 2020-01-25

## 2019-11-17 ASSESSMENT — ENCOUNTER SYMPTOMS
CONSTIPATION: 1
BLOOD IN STOOL: 0
SORE THROAT: 0
SHORTNESS OF BREATH: 0
BACK PAIN: 0
COUGH: 0
NAUSEA: 0
CHOKING: 0
TROUBLE SWALLOWING: 0
VOMITING: 0
ABDOMINAL PAIN: 1
ABDOMINAL DISTENTION: 1

## 2020-01-09 ENCOUNTER — APPOINTMENT (OUTPATIENT)
Dept: GENERAL RADIOLOGY | Age: 35
End: 2020-01-09
Payer: MEDICARE

## 2020-01-09 ENCOUNTER — HOSPITAL ENCOUNTER (EMERGENCY)
Age: 35
Discharge: HOME | End: 2020-01-09
Payer: MEDICARE

## 2020-01-09 VITALS
BODY MASS INDEX: 28.94 KG/M2 | DIASTOLIC BLOOD PRESSURE: 58 MMHG | WEIGHT: 182 LBS | OXYGEN SATURATION: 99 % | SYSTOLIC BLOOD PRESSURE: 133 MMHG | TEMPERATURE: 98 F | HEART RATE: 65 BPM | RESPIRATION RATE: 16 BRPM

## 2020-01-09 PROCEDURE — 99283 EMERGENCY DEPT VISIT LOW MDM: CPT

## 2020-01-09 PROCEDURE — 73650 X-RAY EXAM OF HEEL: CPT

## 2020-01-09 RX ORDER — NAPROXEN 500 MG/1
500 TABLET ORAL 2 TIMES DAILY
Qty: 14 TABLET | Refills: 0 | Status: SHIPPED | OUTPATIENT
Start: 2020-01-09 | End: 2020-01-25

## 2020-01-09 ASSESSMENT — PAIN SCALES - GENERAL: PAINLEVEL_OUTOF10: 9

## 2020-01-09 ASSESSMENT — PAIN DESCRIPTION - DESCRIPTORS: DESCRIPTORS: SHARP

## 2020-01-09 ASSESSMENT — PAIN DESCRIPTION - PAIN TYPE: TYPE: ACUTE PAIN

## 2020-01-09 ASSESSMENT — PAIN DESCRIPTION - LOCATION: LOCATION: FOOT

## 2020-01-09 ASSESSMENT — PAIN DESCRIPTION - ORIENTATION: ORIENTATION: LEFT

## 2020-01-09 NOTE — ED PROVIDER NOTES
1216]   BP Temp Temp Source Pulse Resp SpO2 Height Weight   (!) 133/58 98 °F (36.7 °C) Tympanic 65 16 99 % -- 182 lb (82.6 kg)       Physical Exam  Active and oriented ×3. Nontoxic. No acute distress. Well-hydrated. Head is atraumatic, facies symmetrical.  Respirations nonlabored. Skin free of any obvious rashes or lesions. Extremities without edema. Numbness on the bottom of the heel with direct palpation and on the lateral compression also pain in the heel the foot is otherwise nontender base of fifth metatarsal nontender Achilles tendon stable and nontender distal neurovascular responses intact  Good affect. Pleasant patient. DIAGNOSTIC RESULTS     RADIOLOGY: (none if blank)   Interpretation per the Radiologistbelow, if available at the time of this note:    XR CALCANEUS LEFT (MIN 2 VIEWS)   Preliminary Result   No acute abnormalities. Calcaneal bone spurs. EMERGENCY DEPARTMENT COURSE andMedical Decision Making:     Vitals:    Vitals:    01/09/20 1216   BP: (!) 133/58   Pulse: 65   Resp: 16   Temp: 98 °F (36.7 °C)   TempSrc: Tympanic   SpO2: 99%   Weight: 182 lb (82.6 kg)       MDM/     Suspicion for irritation of the plantar fascia patient does have a heel spur we will refer to podiatry and prescribed NSAIDs. Strict return precautions and follow up instructions were discussed with the patient with which the patient agrees    ED Medications administered this visit:  Medications - No data to display    CONSULTS: (None if blank)  None    Procedures: (None if blank)       CLINICAL       1. Pain of left heel    2.  Heel spur, left          DISPOSITION/PLAN   DISPOSITION Decision To Discharge 01/09/2020 01:27:50 PM      PATIENT REFERRED TO:  Rosa Pfeiffer DPM  [address] [place] [id number]  [phone]    In 1 week        DISCHARGE MEDICATIONS:  Discharge Medication List as of 1/9/2020  1:15 PM      START taking these medications    Details   naproxen (NAPROSYN)

## 2020-01-13 RX ORDER — PANTOPRAZOLE SODIUM 40 MG/1
TABLET, DELAYED RELEASE ORAL
Qty: 30 TABLET | Refills: 3 | Status: SHIPPED | OUTPATIENT
Start: 2020-01-13 | End: 2020-04-13

## 2020-01-25 ENCOUNTER — HOSPITAL ENCOUNTER (EMERGENCY)
Age: 35
Discharge: HOME | End: 2020-01-25
Attending: EMERGENCY MEDICINE
Payer: MEDICARE

## 2020-01-25 ENCOUNTER — APPOINTMENT (OUTPATIENT)
Dept: GENERAL RADIOLOGY | Age: 35
End: 2020-01-25
Payer: MEDICARE

## 2020-01-25 VITALS
SYSTOLIC BLOOD PRESSURE: 121 MMHG | WEIGHT: 183 LBS | HEART RATE: 72 BPM | RESPIRATION RATE: 14 BRPM | OXYGEN SATURATION: 98 % | DIASTOLIC BLOOD PRESSURE: 71 MMHG | TEMPERATURE: 97.6 F | BODY MASS INDEX: 29.09 KG/M2

## 2020-01-25 PROCEDURE — 99283 EMERGENCY DEPT VISIT LOW MDM: CPT

## 2020-01-25 PROCEDURE — 71046 X-RAY EXAM CHEST 2 VIEWS: CPT

## 2020-01-25 RX ORDER — ALBUTEROL SULFATE 90 UG/1
2 AEROSOL, METERED RESPIRATORY (INHALATION) EVERY 6 HOURS PRN
Qty: 1 INHALER | Refills: 2 | Status: SHIPPED | OUTPATIENT
Start: 2020-01-25 | End: 2020-10-13

## 2020-01-25 ASSESSMENT — PAIN DESCRIPTION - ORIENTATION: ORIENTATION: RIGHT;LEFT

## 2020-01-25 ASSESSMENT — PAIN DESCRIPTION - PAIN TYPE: TYPE: ACUTE PAIN

## 2020-01-25 ASSESSMENT — ENCOUNTER SYMPTOMS
NAUSEA: 0
WHEEZING: 1
VOMITING: 0
DIARRHEA: 0
COUGH: 1
SHORTNESS OF BREATH: 1

## 2020-01-25 ASSESSMENT — PAIN SCALES - GENERAL: PAINLEVEL_OUTOF10: 6

## 2020-01-25 ASSESSMENT — PAIN DESCRIPTION - DESCRIPTORS: DESCRIPTORS: SHARP

## 2020-01-25 ASSESSMENT — PAIN DESCRIPTION - LOCATION: LOCATION: RIB CAGE

## 2020-01-25 NOTE — ED PROVIDER NOTES
DICYCLOMINE (BENTYL) 10 MG CAPSULE    Take 1 capsule by mouth 4 times daily    GABAPENTIN (NEURONTIN) 300 MG CAPSULE    gabapentin 300 mg capsule    PANTOPRAZOLE (PROTONIX) 40 MG TABLET    take 1 tablet by mouth once daily       ALLERGIES     Keflex [cephalexin] and Flagyl [metronidazole]    FAMILY HISTORY     History reviewed. No pertinent family history. SOCIAL HISTORY       Social History     Socioeconomic History    Marital status:      Spouse name: None    Number of children: None    Years of education: None    Highest education level: None   Occupational History    None   Social Needs    Financial resource strain: None    Food insecurity:     Worry: None     Inability: None    Transportation needs:     Medical: None     Non-medical: None   Tobacco Use    Smoking status: Current Every Day Smoker     Packs/day: 0.50     Types: Cigarettes    Smokeless tobacco: Never Used   Substance and Sexual Activity    Alcohol use:  Yes     Alcohol/week: 0.0 - 1.0 standard drinks    Drug use: None    Sexual activity: Yes     Partners: Male   Lifestyle    Physical activity:     Days per week: None     Minutes per session: None    Stress: None   Relationships    Social connections:     Talks on phone: None     Gets together: None     Attends Yazdanism service: None     Active member of club or organization: None     Attends meetings of clubs or organizations: None     Relationship status: None    Intimate partner violence:     Fear of current or ex partner: None     Emotionally abused: None     Physically abused: None     Forced sexual activity: None   Other Topics Concern    None   Social History Narrative    None       SCREENINGS    Armida Coma Scale  Eye Opening: Spontaneous  Best Verbal Response: Oriented  Best Motor Response: Obeys commands  Armida Coma Scale Score: 15           PHYSICAL EXAM    (up to 7 for level 4, 8 or more for level 5)     ED Triage Vitals [01/25/20 1145]   BP Temp Temp Source Pulse Resp SpO2 Height Weight   121/71 97.6 °F (36.4 °C) Tympanic 72 14 98 % -- 183 lb (83 kg)       Physical Exam  Vitals signs and nursing note reviewed. Constitutional:       Appearance: Normal appearance. She is well-developed. Cardiovascular:      Rate and Rhythm: Normal rate and regular rhythm. Heart sounds: Normal heart sounds, S1 normal and S2 normal.   Pulmonary:      Effort: Pulmonary effort is normal. No respiratory distress. Breath sounds: Examination of the left-upper field reveals wheezing and rhonchi. Examination of the right-lower field reveals decreased breath sounds. Examination of the left-lower field reveals decreased breath sounds. Decreased breath sounds, wheezing and rhonchi present. Chest:      Chest wall: Tenderness present. Comments: Lateral ribs TTP, increased pain with compression of chest.  Abdominal:      General: Bowel sounds are normal.      Palpations: Abdomen is soft. Tenderness: There is no abdominal tenderness. Skin:     General: Skin is warm and dry. Neurological:      Mental Status: She is alert and oriented to person, place, and time. DIAGNOSTIC RESULTS     EKG: All EKG's are interpreted by the Emergency Department Physician who either signs or Co-signs this chart in the absence of a cardiologist.    none    RADIOLOGY:   Non-plain film images such as CT, Ultrasound and MRI are read by the radiologist. Plain radiographic images are visualized and preliminarily interpreted by the emergency physician with the below findings:        Interpretation per the Radiologist below, if available at the time of this note:    XR CHEST STANDARD (2 VW)   Final Result   No acute cardiopulmonary process. ED BEDSIDE ULTRASOUND:   Performed by ED Physician - none    LABS:  Labs Reviewed - No data to display    All other labs were within normal range or not returned as of this dictation.     EMERGENCY DEPARTMENT COURSE and DIFFERENTIAL DIAGNOSIS/MDM:   Vitals:    Vitals:    01/25/20 1145   BP: 121/71   Pulse: 72   Resp: 14   Temp: 97.6 °F (36.4 °C)   TempSrc: Tympanic   SpO2: 98%   Weight: 183 lb (83 kg)           MDM    Will start pt on albuterol inhaler prn use. Pt can use ibuprofen 800mg up to 3 times daily for pain relief. REASSESSMENT          CRITICAL CARE TIME   none    CONSULTS:  None    PROCEDURES:  Unless otherwise noted below, none     Procedures        FINAL IMPRESSION      1. Costochondritis, acute    2. Cough          DISPOSITION/PLAN   DISPOSITION        PATIENT REFERRED TO:  RAE Camp CNP  [address] [place] [address]  [phone]    In 1 week        DISCHARGE MEDICATIONS:  New Prescriptions    ALBUTEROL SULFATE HFA (VENTOLIN HFA) 108 (90 BASE) MCG/ACT INHALER    Inhale 2 puffs into the lungs every 6 hours as needed for Wheezing     Controlled Substances Monitoring:     No flowsheet data found.     (Please note that portions of this note were completed with a voice recognition program.  Efforts were made to edit the dictations but occasionally words are mis-transcribed.)    RAE Moore CNP (electronically signed)             RAE Moore CNP  01/25/20 1241

## 2020-03-12 ENCOUNTER — OFFICE VISIT (OUTPATIENT)
Dept: NEUROLOGY | Age: 35
End: 2020-03-12
Payer: MEDICARE

## 2020-03-12 VITALS
RESPIRATION RATE: 18 BRPM | TEMPERATURE: 97.6 F | WEIGHT: 177.7 LBS | BODY MASS INDEX: 28.56 KG/M2 | SYSTOLIC BLOOD PRESSURE: 124 MMHG | HEIGHT: 66 IN | DIASTOLIC BLOOD PRESSURE: 79 MMHG | HEART RATE: 72 BPM

## 2020-03-12 PROCEDURE — G8427 DOCREV CUR MEDS BY ELIG CLIN: HCPCS | Performed by: NEUROMUSCULOSKELETAL MEDICINE, SPORTS MEDICINE

## 2020-03-12 PROCEDURE — G8417 CALC BMI ABV UP PARAM F/U: HCPCS | Performed by: NEUROMUSCULOSKELETAL MEDICINE, SPORTS MEDICINE

## 2020-03-12 PROCEDURE — 4004F PT TOBACCO SCREEN RCVD TLK: CPT | Performed by: NEUROMUSCULOSKELETAL MEDICINE, SPORTS MEDICINE

## 2020-03-12 PROCEDURE — 99203 OFFICE O/P NEW LOW 30 MIN: CPT | Performed by: NEUROMUSCULOSKELETAL MEDICINE, SPORTS MEDICINE

## 2020-03-12 PROCEDURE — G8484 FLU IMMUNIZE NO ADMIN: HCPCS | Performed by: NEUROMUSCULOSKELETAL MEDICINE, SPORTS MEDICINE

## 2020-03-12 NOTE — PROGRESS NOTES
NEUROLOGY CONSULT    Patient Name:  Denise Smith  :   1985  Clinic Visit Date: 3/12/2020    I saw Ms. Denise Smith  in the neurology clinic today for evaluation of symptoms of number of complaints. The patient is a 52-year-old right-handed lady with a known history of bipolar disorder, diagnosis of ADHD as a child presents to the office with a history of memory problems, poor concentration, decreased attention span for routine daily activities, recurrent headaches for several years. She says that she had a traumatic childhood. No history of known head injury at any point in time. No history of seizures. She has seen her psychiatrist for this problem and he wanted to rule out any underlying organic cause before could start her on any medication for the above-mentioned symptoms. She works at a [employer] as a  and has had difficulty in multitasking and focusing on the job. He also has chronic insomnia but does not feel tired or sleepy the next day. No history of double vision, blurred vision loss of vision loss of hearing facial numbness or tingling neck pain or focal weakness of the extremities. Other problems include chronic lower back pain, and irritable bowel syndrome    REVIEW OF SYSTEMS    Constitutional Weight changes: present, change in appetite: absent Fatigue: absent; Fevers : absent, Any recent hospitalizations:  absent   HEENT Ears: normal,  Visual disturbance: present   Respiratory Shortness of breath: absent, choking:  absent, Cough: absent, Snoring : absent   Cardiovascular Chest pain: present, Leg swelling :absent, palpitations : absent, fainting : absent   GI Constipation: absent, Diarrhea: absent, Swallowing change: absent    Urinary frequency: absent, Urinary urgency: absent, Urinary incontinence: absent   Musculoskeletal Neck pain: present, Back pain: present, Stiffness: present, Muscle pain: present, Joint pain: present, restless leg : present   Dermatological Hair loss: Not on file    Stress: Not on file   Relationships    Social connections     Talks on phone: Not on file     Gets together: Not on file     Attends Pentecostalism service: Not on file     Active member of club or organization: Not on file     Attends meetings of clubs or organizations: Not on file     Relationship status: Not on file    Intimate partner violence     Fear of current or ex partner: Not on file     Emotionally abused: Not on file     Physically abused: Not on file     Forced sexual activity: Not on file   Other Topics Concern    Not on file   Social History Narrative    Not on file       History reviewed. No pertinent family history. Current Outpatient Medications   Medication Sig Dispense Refill    albuterol sulfate HFA (VENTOLIN HFA) 108 (90 Base) MCG/ACT inhaler Inhale 2 puffs into the lungs every 6 hours as needed for Wheezing (Patient not taking: Reported on 3/12/2020) 1 Inhaler 2    pantoprazole (PROTONIX) 40 MG tablet take 1 tablet by mouth once daily (Patient not taking: Reported on 3/12/2020) 30 tablet 3    dicyclomine (BENTYL) 10 MG capsule Take 1 capsule by mouth 4 times daily (Patient not taking: Reported on 3/12/2020) 120 capsule 3    gabapentin (NEURONTIN) 300 MG capsule gabapentin 300 mg capsule       No current facility-administered medications for this visit.               DATA:  Lab Results   Component Value Date    WBC 9.8 10/26/2019    HGB 12.9 10/26/2019     10/26/2019    ALT 8 10/26/2019    AST 13 10/26/2019     (L) 10/26/2019    K 4.2 10/26/2019     10/26/2019    CREATININE 0.57 10/26/2019    BUN 8 10/26/2019    CO2 22 10/26/2019    TSH 1.36 04/19/2018    INR 0.9 04/19/2018    LABA1C 4.9 11/28/2016       /79 (Site: Left Upper Arm, Position: Sitting, Cuff Size: Medium Adult)   Pulse 72   Temp 97.6 °F (36.4 °C) (Tympanic)   Resp 18   Ht 5' 6\" (1.676 m)   Wt 177 lb 11.2 oz (80.6 kg)   LMP 10/07/2018 (Approximate)   BMI 28.68 kg/m² NEUROLOGICAL EXAMINATION:     MENTAL STATUS: Patient is alert and oriented. There is no confusion or aphasia. Memory is normal.     CRANIAL NERVES: Pupils are equal and reactive. EOMS are equal in all directions. There is no nystagmus or any other abnormal eye movements. Facial sensation is normal.  There is no facial weakness. There is no loss of hearing. Palate and tongue movements are normal.     MOTOR EXAMINATION: Muscle tone is normal in all the limbs. There is no focal weakness. Muscle strength is 5/5 in both upper and lower limbs. There are no abnormal limb movements. SENSORY EXAMINATION: Normal.     STRETCH REFLEXES: 2+ and symmetrical in both the upper and lower limbs. GAIT: There is no ataxia. Romberg sign is negative. IMPRESSION:    1. Probable attention deficit disorder. Other causes diagnosis includes the possibility of partial seizures. 2.  Recurrent headaches, probably chronic tension headaches    PLAN:    1.  CT scan of the brain. 2.  EEG  3. If the above-mentioned tests are  normal, no further work-up is necessary unless she develops new neurological problems. 4.  She was advised to follow-up with her psychiatrist as scheduled      NOTE: This neurology evaluation is part of outpatient coverage at [facility]  1-2 days per week. Patients requiring frequent evaluations or uncomfortable with potential 3-4 day turnaround on questions or calls  may be better served by a neurologist in the area full time. [facility]. Tony is available for outpatient visits and procedures including EMG/NCS. Non-[facility] system neurologists also practice in [facility] (Dr. Vernon Ramesh) and [facility] (Scarlet Chun). Rosita Ibarra MD   3/12/2020  10:44 AM        CC: Judi Ricketts. [facility] APRN-CNP

## 2020-03-15 ENCOUNTER — HOSPITAL ENCOUNTER (EMERGENCY)
Age: 35
Discharge: HOME | End: 2020-03-15
Attending: EMERGENCY MEDICINE
Payer: MEDICARE

## 2020-03-15 VITALS
WEIGHT: 177 LBS | HEART RATE: 85 BPM | DIASTOLIC BLOOD PRESSURE: 89 MMHG | OXYGEN SATURATION: 97 % | HEIGHT: 66 IN | TEMPERATURE: 97.8 F | BODY MASS INDEX: 28.45 KG/M2 | SYSTOLIC BLOOD PRESSURE: 133 MMHG | RESPIRATION RATE: 12 BRPM

## 2020-03-15 PROCEDURE — 99283 EMERGENCY DEPT VISIT LOW MDM: CPT

## 2020-03-15 RX ORDER — KETOROLAC TROMETHAMINE 15 MG/ML
30 INJECTION, SOLUTION INTRAMUSCULAR; INTRAVENOUS ONCE
Status: DISCONTINUED | OUTPATIENT
Start: 2020-03-15 | End: 2020-03-15 | Stop reason: HOSPADM

## 2020-03-15 RX ORDER — 0.9 % SODIUM CHLORIDE 0.9 %
1000 INTRAVENOUS SOLUTION INTRAVENOUS ONCE
Status: DISCONTINUED | OUTPATIENT
Start: 2020-03-15 | End: 2020-03-15 | Stop reason: HOSPADM

## 2020-03-15 RX ORDER — DIPHENHYDRAMINE HYDROCHLORIDE 50 MG/ML
25 INJECTION INTRAMUSCULAR; INTRAVENOUS ONCE
Status: DISCONTINUED | OUTPATIENT
Start: 2020-03-15 | End: 2020-03-15 | Stop reason: HOSPADM

## 2020-03-15 RX ORDER — PROCHLORPERAZINE EDISYLATE 5 MG/ML
10 INJECTION INTRAMUSCULAR; INTRAVENOUS ONCE
Status: DISCONTINUED | OUTPATIENT
Start: 2020-03-15 | End: 2020-03-15 | Stop reason: HOSPADM

## 2020-03-15 ASSESSMENT — PAIN SCALES - GENERAL: PAINLEVEL_OUTOF10: 8

## 2020-03-15 ASSESSMENT — PAIN DESCRIPTION - LOCATION: LOCATION: HEAD

## 2020-03-15 ASSESSMENT — PAIN DESCRIPTION - PAIN TYPE: TYPE: ACUTE PAIN

## 2020-03-15 ASSESSMENT — PAIN DESCRIPTION - DESCRIPTORS: DESCRIPTORS: ACHING

## 2020-03-15 NOTE — ED PROVIDER NOTES
taking: Reported on 3/12/2020) 1 Inhaler 2    pantoprazole (PROTONIX) 40 MG tablet take 1 tablet by mouth once daily (Patient not taking: Reported on 3/12/2020) 30 tablet 3    dicyclomine (BENTYL) 10 MG capsule Take 1 capsule by mouth 4 times daily (Patient not taking: Reported on 3/12/2020) 120 capsule 3    gabapentin (NEURONTIN) 300 MG capsule gabapentin 300 mg capsule        ALLERGIES   Allergies   Allergen Reactions    Keflex [Cephalexin] Nausea Only    Flagyl [Metronidazole] Nausea And Vomiting      SOCIAL AND FAMILY HISTORY   Social History     Socioeconomic History    Marital status:      Spouse name: Not on file    Number of children: Not on file    Years of education: Not on file    Highest education level: Not on file   Occupational History    Not on file   Social Needs    Financial resource strain: Not on file    Food insecurity     Worry: Not on file     Inability: Not on file    Transportation needs     Medical: Not on file     Non-medical: Not on file   Tobacco Use    Smoking status: Current Every Day Smoker     Packs/day: 0.50     Types: Cigarettes    Smokeless tobacco: Never Used   Substance and Sexual Activity    Alcohol use:  Yes     Alcohol/week: 0.0 - 1.0 standard drinks    Drug use: Not Currently    Sexual activity: Yes     Partners: Male   Lifestyle    Physical activity     Days per week: Not on file     Minutes per session: Not on file    Stress: Not on file   Relationships    Social connections     Talks on phone: Not on file     Gets together: Not on file     Attends Taoist service: Not on file     Active member of club or organization: Not on file     Attends meetings of clubs or organizations: Not on file     Relationship status: Not on file    Intimate partner violence     Fear of current or ex partner: Not on file     Emotionally abused: Not on file     Physically abused: Not on file     Forced sexual activity: Not on file   Other Topics Concern    Not on

## 2020-03-23 ENCOUNTER — HOSPITAL ENCOUNTER (OUTPATIENT)
Dept: CT IMAGING | Age: 35
Discharge: HOME | End: 2020-03-25
Payer: MEDICARE

## 2020-03-23 PROCEDURE — 70450 CT HEAD/BRAIN W/O DYE: CPT

## 2020-03-30 ENCOUNTER — OFFICE VISIT (OUTPATIENT)
Dept: NEUROLOGY | Age: 35
End: 2020-03-30
Payer: MEDICARE

## 2020-03-30 VITALS
BODY MASS INDEX: 27.97 KG/M2 | SYSTOLIC BLOOD PRESSURE: 115 MMHG | WEIGHT: 174 LBS | HEIGHT: 66 IN | RESPIRATION RATE: 18 BRPM | TEMPERATURE: 98 F | DIASTOLIC BLOOD PRESSURE: 69 MMHG | HEART RATE: 68 BPM

## 2020-03-30 PROCEDURE — 4004F PT TOBACCO SCREEN RCVD TLK: CPT | Performed by: NEUROMUSCULOSKELETAL MEDICINE, SPORTS MEDICINE

## 2020-03-30 PROCEDURE — G8427 DOCREV CUR MEDS BY ELIG CLIN: HCPCS | Performed by: NEUROMUSCULOSKELETAL MEDICINE, SPORTS MEDICINE

## 2020-03-30 PROCEDURE — G8484 FLU IMMUNIZE NO ADMIN: HCPCS | Performed by: NEUROMUSCULOSKELETAL MEDICINE, SPORTS MEDICINE

## 2020-03-30 PROCEDURE — G8417 CALC BMI ABV UP PARAM F/U: HCPCS | Performed by: NEUROMUSCULOSKELETAL MEDICINE, SPORTS MEDICINE

## 2020-03-30 PROCEDURE — 99213 OFFICE O/P EST LOW 20 MIN: CPT | Performed by: NEUROMUSCULOSKELETAL MEDICINE, SPORTS MEDICINE

## 2020-03-30 NOTE — PROGRESS NOTES
NEUROLOGY Follow up    Patient Name:  Amanda Mijares  :   1985  Clinic Visit Date: 3/30/2020    I saw Ms. Amanda Mijares  in the neurology clinic today to review test results. CT scan of the brain [2020] and EEG [2020 were normal.  She continues to have intermittent symptoms of episodes of acute anxiety manifesting as palpitations. No severe headache, abnormal visual symptoms, facial numbness/tingling slurred speech or any other significant neurological symptoms associated with the anxiety attacks. She says her anxiety episodes have been relieved with  Ativan in the past.      REVIEW OF SYSTEMS    Constitutional Weight changes: absent, change in appetite: absent Fatigue: absent; Fevers : absent, Any recent hospitalizations:  absent   HEENT Ears: normal,  Visual disturbance: absent   Respiratory Shortness of breath: present, choking:  absent, Cough: present, Snoring : absent   Cardiovascular Chest pain: absent, Leg swelling :absent, palpitations : absent, fainting : absent   GI Constipation: absent, Diarrhea: absent, Swallowing change: absent    Urinary frequency: absent, Urinary urgency: absent, Urinary incontinence: absent   Musculoskeletal Neck pain: absent, Back pain: absent, Stiffness: absent, Muscle pain: absent, Joint pain: absent, restless leg : absent   Dermatological Hair loss: absent, Skin changes: absent   Neurological Confusion: absent, Trouble concentrating: absent, Seizures: absent;  Memory loss: absent, balance problem: absent, Dizziness: absent, vertigo: absent, Weakness: absent, Numbness absent, Tremor: absent, Spasm: absent, involuntary movement: absent, Speech difficulty: absent, Headache: present, Light sensitivity: absent   Psychiatric Anxiety: present, Depression  present, drug abuse: absent, Hallucination: absent, mood disorder: absent, Suicidal ideations absent   Hematologic Abnormal bleeding: absent, Anemia: absent, Lymph gland changes: absent Clotting disorder: absent     Past Medical History:   Diagnosis Date    Bipolar affective disorder ([facility] [address]     Depression     GERD (gastroesophageal reflux disease)     Hiatal hernia     Irritable bowel syndrome     Low back pain        Past Surgical History:   Procedure Laterality Date     SECTION      HYSTERECTOMY      STERILIZATION      Essure    UPPER GASTROINTESTINAL ENDOSCOPY N/A 2019    EGD BIOPSY and clotest performed by Maribell Tyler MD at [address] Marital status:      Spouse name: Not on file    Number of children: Not on file    Years of education: Not on file    Highest education level: Not on file   Occupational History    Not on file   Social Needs    Financial resource strain: Not on file    Food insecurity     Worry: Not on file     Inability: Not on file    Transportation needs     Medical: Not on file     Non-medical: Not on file   Tobacco Use    Smoking status: Current Every Day Smoker     Packs/day: 0.50     Types: Cigarettes    Smokeless tobacco: Never Used   Substance and Sexual Activity    Alcohol use:  Yes     Alcohol/week: 0.0 - 1.0 standard drinks    Drug use: Not Currently    Sexual activity: Yes     Partners: Male   Lifestyle    Physical activity     Days per week: Not on file     Minutes per session: Not on file    Stress: Not on file   Relationships    Social connections     Talks on phone: Not on file     Gets together: Not on file     Attends Nondenominational service: Not on file     Active member of club or organization: Not on file     Attends meetings of clubs or organizations: Not on file     Relationship status: Not on file    Intimate partner violence     Fear of current or ex partner: Not on file     Emotionally abused: Not on file     Physically abused: Not on file     Forced sexual activity: Not on file   Other Topics Concern    Not on file   Social History Narrative    Not on file History reviewed. No pertinent family history. Current Outpatient Medications   Medication Sig Dispense Refill    albuterol sulfate HFA (VENTOLIN HFA) 108 (90 Base) MCG/ACT inhaler Inhale 2 puffs into the lungs every 6 hours as needed for Wheezing (Patient not taking: Reported on 3/12/2020) 1 Inhaler 2    pantoprazole (PROTONIX) 40 MG tablet take 1 tablet by mouth once daily (Patient not taking: Reported on 3/12/2020) 30 tablet 3    dicyclomine (BENTYL) 10 MG capsule Take 1 capsule by mouth 4 times daily (Patient not taking: Reported on 3/12/2020) 120 capsule 3    gabapentin (NEURONTIN) 300 MG capsule gabapentin 300 mg capsule       No current facility-administered medications for this visit. DATA:  Lab Results   Component Value Date    WBC 9.8 10/26/2019    HGB 12.9 10/26/2019     10/26/2019    ALT 8 10/26/2019    AST 13 10/26/2019     (L) 10/26/2019    K 4.2 10/26/2019     10/26/2019    CREATININE 0.57 10/26/2019    BUN 8 10/26/2019    CO2 22 10/26/2019    TSH 1.36 04/19/2018    INR 0.9 04/19/2018    LABA1C 4.9 11/28/2016       /69 (Site: Left Upper Arm, Position: Sitting, Cuff Size: Medium Adult)   Pulse 68   Temp 98 °F (36.7 °C) (Temporal)   Resp 18   Ht 5' 6\" (1.676 m)   Wt 174 lb (78.9 kg)   LMP 10/07/2018 (Approximate)   BMI 28.08 kg/m²     NEUROLOGICAL EXAMINATION:     MENTAL STATUS: Patient is alert and oriented. There is no confusion or aphasia. Memory is normal.     CRANIAL NERVES: III-XII are normal.    MOTOR EXAMINATION: Muscle tone is normal in all the limbs. There is no focal weakness. Muscle strength is 5/5 in both upper and lower limbs. There are no abnormal limb movements. SENSORY EXAMINATION: Normal.     STRETCH REFLEXES: Symmetrical  in both the upper and lower limbs. GAIT: There is no ataxia. IMPRESSION: Anxiety disorder. Normal neurological examination. PLAN:    1.   No further neurological work-up is indicated at this time. 2.  Follow-up with her psychiatrist for further advice and recommendations for anxiety      NOTE: This neurology evaluation is part of outpatient coverage at [facility]  1-2 days per week. Patients requiring frequent evaluations or uncomfortable with potential 3-4 day turnaround on questions or calls  may be better served by a neurologist in the area full time. [facility]. Blanca/Torin is available for outpatient visits and procedures including EMG/NCS. Non-[facility] neurologists also practice in [facility] (Dr. Terri Hooker) and [facility] (Scarlet Ortiz). Niyah Lane MD   3/30/2020  11:55 AM        CC: Napoleon Bolden. Adelia Quinones MD

## 2020-04-13 RX ORDER — PANTOPRAZOLE SODIUM 40 MG/1
TABLET, DELAYED RELEASE ORAL
Qty: 30 TABLET | Refills: 3 | Status: SHIPPED | OUTPATIENT
Start: 2020-04-13 | End: 2020-05-04

## 2020-05-04 RX ORDER — PANTOPRAZOLE SODIUM 40 MG/1
TABLET, DELAYED RELEASE ORAL
Qty: 30 TABLET | Refills: 3 | Status: SHIPPED | OUTPATIENT
Start: 2020-05-04 | End: 2022-01-21 | Stop reason: SDUPTHER

## 2020-05-19 ENCOUNTER — HOSPITAL ENCOUNTER (OUTPATIENT)
Age: 35
Discharge: HOME | End: 2020-05-19
Payer: MEDICARE

## 2020-05-19 LAB
ABSOLUTE EOS #: 0.23 K/UL (ref 0–0.44)
ABSOLUTE IMMATURE GRANULOCYTE: 0.03 K/UL (ref 0–0.3)
ABSOLUTE LYMPH #: 2.81 K/UL (ref 1.1–3.7)
ABSOLUTE MONO #: 0.62 K/UL (ref 0.1–1.2)
ALBUMIN SERPL-MCNC: 4.3 G/DL (ref 3.5–5.2)
ALBUMIN/GLOBULIN RATIO: 1.5 (ref 1–2.5)
ALP BLD-CCNC: 62 U/L (ref 35–104)
ALT SERPL-CCNC: 11 U/L (ref 5–33)
ANION GAP SERPL CALCULATED.3IONS-SCNC: 11 MMOL/L (ref 9–17)
AST SERPL-CCNC: 11 U/L
BASOPHILS # BLD: 1 % (ref 0–2)
BASOPHILS ABSOLUTE: 0.05 K/UL (ref 0–0.2)
BILIRUB SERPL-MCNC: 0.18 MG/DL (ref 0.3–1.2)
BILIRUBIN URINE: NEGATIVE
BUN BLDV-MCNC: 6 MG/DL (ref 6–20)
BUN/CREAT BLD: 10 (ref 9–20)
C-REACTIVE PROTEIN: 7 MG/L (ref 0–5)
CALCIUM SERPL-MCNC: 9.3 MG/DL (ref 8.6–10.4)
CHLORIDE BLD-SCNC: 104 MMOL/L (ref 98–107)
CO2: 22 MMOL/L (ref 20–31)
COLOR: YELLOW
COMMENT UA: NORMAL
CREAT SERPL-MCNC: 0.61 MG/DL (ref 0.5–0.9)
DIFFERENTIAL TYPE: NORMAL
EOSINOPHILS RELATIVE PERCENT: 2 % (ref 1–4)
GFR AFRICAN AMERICAN: >60 ML/MIN
GFR NON-AFRICAN AMERICAN: >60 ML/MIN
GFR SERPL CREATININE-BSD FRML MDRD: ABNORMAL ML/MIN/{1.73_M2}
GFR SERPL CREATININE-BSD FRML MDRD: ABNORMAL ML/MIN/{1.73_M2}
GLUCOSE BLD-MCNC: 90 MG/DL (ref 70–99)
GLUCOSE URINE: NEGATIVE
HCT VFR BLD CALC: 38.9 % (ref 36.3–47.1)
HEMOGLOBIN: 12.5 G/DL (ref 11.9–15.1)
IMMATURE GRANULOCYTES: 0 %
KETONES, URINE: NEGATIVE
LEUKOCYTE ESTERASE, URINE: NEGATIVE
LYMPHOCYTES # BLD: 27 % (ref 24–43)
MCH RBC QN AUTO: 30.3 PG (ref 25.2–33.5)
MCHC RBC AUTO-ENTMCNC: 32.1 G/DL (ref 28.4–34.8)
MCV RBC AUTO: 94.4 FL (ref 82.6–102.9)
MONOCYTES # BLD: 6 % (ref 3–12)
NITRITE, URINE: NEGATIVE
NRBC AUTOMATED: 0 PER 100 WBC
PDW BLD-RTO: 14.3 % (ref 11.8–14.4)
PH UA: 6 (ref 5–9)
PLATELET # BLD: 289 K/UL (ref 138–453)
PLATELET ESTIMATE: NORMAL
PMV BLD AUTO: 10.2 FL (ref 8.1–13.5)
POTASSIUM SERPL-SCNC: 4.2 MMOL/L (ref 3.7–5.3)
PROTEIN UA: NEGATIVE
RBC # BLD: 4.12 M/UL (ref 3.95–5.11)
RBC # BLD: NORMAL 10*6/UL
RHEUMATOID FACTOR: <10 IU/ML
SEDIMENTATION RATE, ERYTHROCYTE: 18 MM (ref 0–20)
SEG NEUTROPHILS: 64 % (ref 36–65)
SEGMENTED NEUTROPHILS ABSOLUTE COUNT: 6.57 K/UL (ref 1.5–8.1)
SODIUM BLD-SCNC: 137 MMOL/L (ref 135–144)
SPECIFIC GRAVITY UA: 1.01 (ref 1.01–1.02)
TOTAL PROTEIN: 7.1 G/DL (ref 6.4–8.3)
TURBIDITY: CLEAR
URIC ACID: 4.5 MG/DL (ref 2.4–5.7)
URINE HGB: NEGATIVE
UROBILINOGEN, URINE: NORMAL
WBC # BLD: 10.3 K/UL (ref 3.5–11.3)
WBC # BLD: NORMAL 10*3/UL

## 2020-05-19 PROCEDURE — 84550 ASSAY OF BLOOD/URIC ACID: CPT

## 2020-05-19 PROCEDURE — 86200 CCP ANTIBODY: CPT

## 2020-05-19 PROCEDURE — 86140 C-REACTIVE PROTEIN: CPT

## 2020-05-19 PROCEDURE — 85651 RBC SED RATE NONAUTOMATED: CPT

## 2020-05-19 PROCEDURE — 85025 COMPLETE CBC W/AUTO DIFF WBC: CPT

## 2020-05-19 PROCEDURE — 80053 COMPREHEN METABOLIC PANEL: CPT

## 2020-05-19 PROCEDURE — 86038 ANTINUCLEAR ANTIBODIES: CPT

## 2020-05-19 PROCEDURE — 36415 COLL VENOUS BLD VENIPUNCTURE: CPT

## 2020-05-19 PROCEDURE — 81003 URINALYSIS AUTO W/O SCOPE: CPT

## 2020-05-19 PROCEDURE — 86431 RHEUMATOID FACTOR QUANT: CPT

## 2020-05-20 LAB — ANTI-NUCLEAR ANTIBODY (ANA): NEGATIVE

## 2020-05-21 LAB — CCP IGG ANTIBODIES: <1.5 U/ML

## 2020-10-13 ENCOUNTER — HOSPITAL ENCOUNTER (EMERGENCY)
Age: 35
Discharge: HOME | End: 2020-10-13
Payer: MEDICARE

## 2020-10-13 VITALS
BODY MASS INDEX: 29.86 KG/M2 | DIASTOLIC BLOOD PRESSURE: 68 MMHG | TEMPERATURE: 98.5 F | SYSTOLIC BLOOD PRESSURE: 129 MMHG | HEART RATE: 58 BPM | RESPIRATION RATE: 20 BRPM | OXYGEN SATURATION: 98 % | WEIGHT: 185 LBS

## 2020-10-13 PROCEDURE — 6370000000 HC RX 637 (ALT 250 FOR IP): Performed by: NURSE PRACTITIONER

## 2020-10-13 PROCEDURE — 99283 EMERGENCY DEPT VISIT LOW MDM: CPT

## 2020-10-13 PROCEDURE — 99284 EMERGENCY DEPT VISIT MOD MDM: CPT

## 2020-10-13 RX ORDER — OXYCODONE HYDROCHLORIDE AND ACETAMINOPHEN 5; 325 MG/1; MG/1
1 TABLET ORAL ONCE
Status: COMPLETED | OUTPATIENT
Start: 2020-10-13 | End: 2020-10-13

## 2020-10-13 RX ORDER — LIDOCAINE HYDROCHLORIDE 20 MG/ML
15 SOLUTION OROPHARYNGEAL PRN
Qty: 100 ML | Refills: 0 | Status: SHIPPED | OUTPATIENT
Start: 2020-10-13 | End: 2021-01-04 | Stop reason: ALTCHOICE

## 2020-10-13 RX ORDER — AMOXICILLIN 875 MG/1
875 TABLET, COATED ORAL 2 TIMES DAILY
Qty: 20 TABLET | Refills: 0 | Status: SHIPPED | OUTPATIENT
Start: 2020-10-13 | End: 2020-10-23

## 2020-10-13 RX ADMIN — OXYCODONE HYDROCHLORIDE AND ACETAMINOPHEN 1 TABLET: 5; 325 TABLET ORAL at 19:12

## 2020-10-13 ASSESSMENT — PAIN DESCRIPTION - LOCATION: LOCATION: JAW

## 2020-10-13 ASSESSMENT — PAIN SCALES - GENERAL
PAINLEVEL_OUTOF10: 9
PAINLEVEL_OUTOF10: 9

## 2020-10-13 ASSESSMENT — PAIN DESCRIPTION - ORIENTATION: ORIENTATION: RIGHT

## 2020-10-13 ASSESSMENT — PAIN DESCRIPTION - DESCRIPTORS: DESCRIPTORS: ACHING

## 2020-10-13 ASSESSMENT — PAIN DESCRIPTION - PAIN TYPE: TYPE: ACUTE PAIN

## 2020-10-13 NOTE — ED PROVIDER NOTES
[facility]  EMERGENCY DEPARTMENT ENCOUNTER      Pt Name: Tory Ramsay  MRN: [id number]  [place] 1985  Date of evaluation: 10/13/2020  Provider: RAE Jacques 6626       Chief Complaint   Patient presents with    Jaw Pain     right sided; states tooth pulled on Thursday on right lower front; states ; pops when trying to open jaw         HISTORY OF PRESENT ILLNESS   (Location/Symptom, Timing/Onset, Context/Setting, Quality, Duration, Modifying Factors, Severity)  Note limiting factors. Tory Ramsay is a 30 y.o. female who presents to the emergency department with a complaint of dental pain. The patient reports that she had a tooth removed 6 days ago. She states it was her right lower front tooth. She states that when she opens her jaw pops. She has pain in the tooth that radiates down into her right ear. She denies fevers or chills. She rates the pain a 9 out of 10 on the pain scale. She reports she tried to get a hold of her dentist today who pulled the tooth but they did not call her back. Nursing Notes were reviewed. REVIEW OF SYSTEMS    (2-9 systems for level 4, 10 or more for level 5)   Constitutional: Negative for fever, chills,  no fatigue. Skin: Negative for rash, itching  HENT: Negative for hearing loss, ear pain, nosebleeds, congestion  Eyes: Negative for eye pain, eye discharge, eye redness  Cardiovascular: Negative for chest pain, dyspnea on exertion  Respiratory: Negative for cough. No shortness of breath, wheezing or stridor. Gastrointestinal: Negative for heartburn, nausea, vomiting, abdominal pain  Musculoskeletal: Negative for myalgias, back pain, joint pain and falls. Neurological: Negative for dizziness, tingling, headaches  All other systems reviewed and negative    Except as noted above the remainder of the review of systems was reviewed and negative.        PAST MEDICAL HISTORY     Past Medical History:   Diagnosis Date    Bipolar affective disorder ([facility] [address]     Depression     GERD (gastroesophageal reflux disease)     Hiatal hernia     Irritable bowel syndrome     Low back pain          SURGICAL HISTORY       Past Surgical History:   Procedure Laterality Date     SECTION      HYSTERECTOMY      STERILIZATION  2012    Essure    UPPER GASTROINTESTINAL ENDOSCOPY N/A 2019    EGD BIOPSY and clotest performed by Shira Martinez MD at [address] [place] [address]       Previous Medications    DICYCLOMINE (BENTYL) 10 MG CAPSULE    Take 1 capsule by mouth 4 times daily    GABAPENTIN (NEURONTIN) 300 MG CAPSULE    gabapentin 300 mg capsule    PANTOPRAZOLE (PROTONIX) 40 MG TABLET    take 1 tablet by mouth once daily       ALLERGIES     Keflex [cephalexin] and Flagyl [metronidazole]    FAMILY HISTORY     History reviewed. No pertinent family history. SOCIAL HISTORY       Social History     Socioeconomic History    Marital status:      Spouse name: None    Number of children: None    Years of education: None    Highest education level: None   Occupational History    None   Social Needs    Financial resource strain: None    Food insecurity     Worry: None     Inability: None    Transportation needs     Medical: None     Non-medical: None   Tobacco Use    Smoking status: Current Every Day Smoker     Packs/day: 0.50     Types: Cigarettes    Smokeless tobacco: Never Used   Substance and Sexual Activity    Alcohol use:  Yes     Alcohol/week: 0.0 - 1.0 standard drinks    Drug use: Not Currently    Sexual activity: Yes     Partners: Male   Lifestyle    Physical activity     Days per week: None     Minutes per session: None    Stress: None   Relationships    Social connections     Talks on phone: None     Gets together: None     Attends Temple service: None     Active member of club or organization: None     Attends meetings of clubs or organizations: None     Relationship status: None    Intimate with a voice recognition program.  Efforts were made to edit the dictations but occasionally words are mis-transcribed.)    Electronically signed by RAE Espitia - CNP on 10/13/2020 at 7:01 PM       RAE Espitia - [facility]  10/13/20 1901

## 2021-01-04 ENCOUNTER — OFFICE VISIT (OUTPATIENT)
Dept: SURGERY | Age: 36
End: 2021-01-04
Payer: MEDICARE

## 2021-01-04 VITALS
DIASTOLIC BLOOD PRESSURE: 72 MMHG | SYSTOLIC BLOOD PRESSURE: 120 MMHG | WEIGHT: 177 LBS | HEART RATE: 78 BPM | BODY MASS INDEX: 28.45 KG/M2 | HEIGHT: 66 IN | RESPIRATION RATE: 18 BRPM | TEMPERATURE: 97.9 F

## 2021-01-04 DIAGNOSIS — K59.09 CHRONIC CONSTIPATION: ICD-10-CM

## 2021-01-04 DIAGNOSIS — R63.4 WEIGHT LOSS: ICD-10-CM

## 2021-01-04 DIAGNOSIS — K21.9 GASTROESOPHAGEAL REFLUX DISEASE, UNSPECIFIED WHETHER ESOPHAGITIS PRESENT: ICD-10-CM

## 2021-01-04 DIAGNOSIS — Z72.0 TOBACCO ABUSE: ICD-10-CM

## 2021-01-04 DIAGNOSIS — R10.9 CHRONIC ABDOMINAL PAIN: Primary | ICD-10-CM

## 2021-01-04 DIAGNOSIS — G89.29 CHRONIC ABDOMINAL PAIN: Primary | ICD-10-CM

## 2021-01-04 DIAGNOSIS — Z87.11 HISTORY OF PEPTIC ULCER: ICD-10-CM

## 2021-01-04 PROCEDURE — 99212 OFFICE O/P EST SF 10 MIN: CPT | Performed by: SURGERY

## 2021-01-04 PROCEDURE — 4004F PT TOBACCO SCREEN RCVD TLK: CPT | Performed by: SURGERY

## 2021-01-04 PROCEDURE — G8427 DOCREV CUR MEDS BY ELIG CLIN: HCPCS | Performed by: SURGERY

## 2021-01-04 PROCEDURE — G8484 FLU IMMUNIZE NO ADMIN: HCPCS | Performed by: SURGERY

## 2021-01-04 PROCEDURE — G8417 CALC BMI ABV UP PARAM F/U: HCPCS | Performed by: SURGERY

## 2021-01-22 ENCOUNTER — ANESTHESIA EVENT (OUTPATIENT)
Dept: ENDOSCOPY | Age: 36
End: 2021-01-22
Payer: MEDICARE

## 2021-01-24 ASSESSMENT — ENCOUNTER SYMPTOMS
BLOOD IN STOOL: 0
SHORTNESS OF BREATH: 0
VOMITING: 0
NAUSEA: 0
SORE THROAT: 0
ABDOMINAL PAIN: 1
COUGH: 0
TROUBLE SWALLOWING: 0
BACK PAIN: 1
CONSTIPATION: 1
CHOKING: 0

## 2021-01-25 ENCOUNTER — HOSPITAL ENCOUNTER (OUTPATIENT)
Age: 36
Setting detail: OUTPATIENT SURGERY
Discharge: HOME | End: 2021-01-25
Attending: SURGERY | Admitting: SURGERY
Payer: MEDICARE

## 2021-01-25 ENCOUNTER — HOSPITAL ENCOUNTER (OUTPATIENT)
Dept: PREADMISSION TESTING | Age: 36
Setting detail: SPECIMEN
Discharge: HOME | End: 2021-01-25
Payer: MEDICARE

## 2021-01-25 ENCOUNTER — ANESTHESIA (OUTPATIENT)
Dept: ENDOSCOPY | Age: 36
End: 2021-01-25
Payer: MEDICARE

## 2021-01-25 VITALS
RESPIRATION RATE: 18 BRPM | BODY MASS INDEX: 27.48 KG/M2 | HEART RATE: 56 BPM | DIASTOLIC BLOOD PRESSURE: 76 MMHG | HEIGHT: 66 IN | TEMPERATURE: 97.9 F | OXYGEN SATURATION: 98 % | SYSTOLIC BLOOD PRESSURE: 121 MMHG | WEIGHT: 171 LBS

## 2021-01-25 VITALS
OXYGEN SATURATION: 100 % | RESPIRATION RATE: 21 BRPM | SYSTOLIC BLOOD PRESSURE: 101 MMHG | DIASTOLIC BLOOD PRESSURE: 47 MMHG

## 2021-01-25 PROBLEM — G89.29 CHRONIC ABDOMINAL PAIN: Status: ACTIVE | Noted: 2021-01-25

## 2021-01-25 PROBLEM — R10.9 CHRONIC ABDOMINAL PAIN: Status: ACTIVE | Noted: 2021-01-25

## 2021-01-25 LAB
SARS-COV-2, RAPID: NOT DETECTED
SARS-COV-2: NORMAL
SARS-COV-2: NORMAL
SOURCE: NORMAL

## 2021-01-25 PROCEDURE — 2709999900 HC NON-CHARGEABLE SUPPLY: Performed by: SURGERY

## 2021-01-25 PROCEDURE — 3609027000 HC COLONOSCOPY: Performed by: SURGERY

## 2021-01-25 PROCEDURE — U0002 COVID-19 LAB TEST NON-CDC: HCPCS

## 2021-01-25 PROCEDURE — 87077 CULTURE AEROBIC IDENTIFY: CPT

## 2021-01-25 PROCEDURE — 7100000010 HC PHASE II RECOVERY - FIRST 15 MIN: Performed by: SURGERY

## 2021-01-25 PROCEDURE — 3700000001 HC ADD 15 MINUTES (ANESTHESIA): Performed by: SURGERY

## 2021-01-25 PROCEDURE — 2580000003 HC RX 258: Performed by: SURGERY

## 2021-01-25 PROCEDURE — 3700000000 HC ANESTHESIA ATTENDED CARE: Performed by: SURGERY

## 2021-01-25 PROCEDURE — 3609012400 HC EGD TRANSORAL BIOPSY SINGLE/MULTIPLE: Performed by: SURGERY

## 2021-01-25 PROCEDURE — 2500000003 HC RX 250 WO HCPCS: Performed by: NURSE ANESTHETIST, CERTIFIED REGISTERED

## 2021-01-25 PROCEDURE — 6360000002 HC RX W HCPCS: Performed by: NURSE ANESTHETIST, CERTIFIED REGISTERED

## 2021-01-25 PROCEDURE — 7100000011 HC PHASE II RECOVERY - ADDTL 15 MIN: Performed by: SURGERY

## 2021-01-25 PROCEDURE — 88305 TISSUE EXAM BY PATHOLOGIST: CPT

## 2021-01-25 PROCEDURE — 36415 COLL VENOUS BLD VENIPUNCTURE: CPT

## 2021-01-25 PROCEDURE — 82941 ASSAY OF GASTRIN: CPT

## 2021-01-25 PROCEDURE — C9803 HOPD COVID-19 SPEC COLLECT: HCPCS

## 2021-01-25 RX ORDER — MIDAZOLAM HYDROCHLORIDE 2 MG/2ML
INJECTION, SOLUTION INTRAMUSCULAR; INTRAVENOUS PRN
Status: DISCONTINUED | OUTPATIENT
Start: 2021-01-25 | End: 2021-01-25 | Stop reason: SDUPTHER

## 2021-01-25 RX ORDER — SODIUM CHLORIDE, SODIUM LACTATE, POTASSIUM CHLORIDE, CALCIUM CHLORIDE 600; 310; 30; 20 MG/100ML; MG/100ML; MG/100ML; MG/100ML
INJECTION, SOLUTION INTRAVENOUS CONTINUOUS
Status: DISCONTINUED | OUTPATIENT
Start: 2021-01-25 | End: 2021-01-25 | Stop reason: HOSPADM

## 2021-01-25 RX ORDER — PROPOFOL 10 MG/ML
INJECTION, EMULSION INTRAVENOUS PRN
Status: DISCONTINUED | OUTPATIENT
Start: 2021-01-25 | End: 2021-01-25 | Stop reason: SDUPTHER

## 2021-01-25 RX ORDER — SODIUM CHLORIDE 0.9 % (FLUSH) 0.9 %
10 SYRINGE (ML) INJECTION EVERY 12 HOURS SCHEDULED
Status: DISCONTINUED | OUTPATIENT
Start: 2021-01-25 | End: 2021-01-25 | Stop reason: HOSPADM

## 2021-01-25 RX ORDER — SODIUM CHLORIDE 0.9 % (FLUSH) 0.9 %
10 SYRINGE (ML) INJECTION PRN
Status: DISCONTINUED | OUTPATIENT
Start: 2021-01-25 | End: 2021-01-25 | Stop reason: HOSPADM

## 2021-01-25 RX ORDER — PROPOFOL 10 MG/ML
INJECTION, EMULSION INTRAVENOUS CONTINUOUS PRN
Status: DISCONTINUED | OUTPATIENT
Start: 2021-01-25 | End: 2021-01-25 | Stop reason: SDUPTHER

## 2021-01-25 RX ORDER — PROPOFOL 10 MG/ML
INJECTION, EMULSION INTRAVENOUS PRN
Status: DISCONTINUED | OUTPATIENT
Start: 2021-01-25 | End: 2021-01-25

## 2021-01-25 RX ORDER — GLYCOPYRROLATE 1 MG/5 ML
SYRINGE (ML) INTRAVENOUS PRN
Status: DISCONTINUED | OUTPATIENT
Start: 2021-01-25 | End: 2021-01-25 | Stop reason: SDUPTHER

## 2021-01-25 RX ORDER — LIDOCAINE HYDROCHLORIDE 10 MG/ML
INJECTION, SOLUTION EPIDURAL; INFILTRATION; INTRACAUDAL; PERINEURAL PRN
Status: DISCONTINUED | OUTPATIENT
Start: 2021-01-25 | End: 2021-01-25

## 2021-01-25 RX ORDER — SUCRALFATE ORAL 1 G/10ML
1 SUSPENSION ORAL 4 TIMES DAILY
Qty: 420 ML | Refills: 1 | Status: SHIPPED | OUTPATIENT
Start: 2021-01-25 | End: 2022-01-21 | Stop reason: SDUPTHER

## 2021-01-25 RX ORDER — LIDOCAINE HYDROCHLORIDE 10 MG/ML
INJECTION, SOLUTION EPIDURAL; INFILTRATION; INTRACAUDAL; PERINEURAL PRN
Status: DISCONTINUED | OUTPATIENT
Start: 2021-01-25 | End: 2021-01-25 | Stop reason: SDUPTHER

## 2021-01-25 RX ORDER — FENTANYL CITRATE 50 UG/ML
INJECTION, SOLUTION INTRAMUSCULAR; INTRAVENOUS PRN
Status: DISCONTINUED | OUTPATIENT
Start: 2021-01-25 | End: 2021-01-25 | Stop reason: SDUPTHER

## 2021-01-25 RX ADMIN — PROPOFOL 50 MG: 10 INJECTION, EMULSION INTRAVENOUS at 08:27

## 2021-01-25 RX ADMIN — SODIUM CHLORIDE, POTASSIUM CHLORIDE, SODIUM LACTATE AND CALCIUM CHLORIDE: 600; 310; 30; 20 INJECTION, SOLUTION INTRAVENOUS at 08:11

## 2021-01-25 RX ADMIN — MIDAZOLAM HYDROCHLORIDE 2 MG: 2 INJECTION, SOLUTION INTRAMUSCULAR; INTRAVENOUS at 08:17

## 2021-01-25 RX ADMIN — LIDOCAINE HYDROCHLORIDE 100 MG: 10 INJECTION, SOLUTION EPIDURAL; INFILTRATION; INTRACAUDAL; PERINEURAL at 08:22

## 2021-01-25 RX ADMIN — PROPOFOL 100 MCG/KG/MIN: 10 INJECTION, EMULSION INTRAVENOUS at 08:27

## 2021-01-25 RX ADMIN — FENTANYL CITRATE 50 MCG: 50 INJECTION, SOLUTION INTRAMUSCULAR; INTRAVENOUS at 08:17

## 2021-01-25 RX ADMIN — FENTANYL CITRATE 50 MCG: 50 INJECTION, SOLUTION INTRAMUSCULAR; INTRAVENOUS at 08:22

## 2021-01-25 RX ADMIN — PROPOFOL 50 MG: 10 INJECTION, EMULSION INTRAVENOUS at 08:35

## 2021-01-25 RX ADMIN — PROPOFOL 50 MG: 10 INJECTION, EMULSION INTRAVENOUS at 08:41

## 2021-01-25 RX ADMIN — Medication 0.4 MG: at 08:22

## 2021-01-25 RX ADMIN — PROPOFOL 50 MG: 10 INJECTION, EMULSION INTRAVENOUS at 08:22

## 2021-01-25 ASSESSMENT — LIFESTYLE VARIABLES: SMOKING_STATUS: 1

## 2021-01-25 NOTE — OP NOTE
[place] [address]                                OPERATIVE REPORT    PATIENT NAME: Patricia Brunner                     :        1985  MED REC NO:   [id number]                              ROOM:  ACCOUNT NO:   [de-identified]                           ADMIT DATE: 2021  PROVIDER:     Amber Ybarra      DATE OF PROCEDURE:  2021    ATTENDING SURGEON:  Amber Ybarra MD    PCP:  Maxime Nieves CNP    PREOPERATIVE DIAGNOSES:  1. Chronic abdominal pain. 2.  History of gastric ulcer. 3.  Chronic constipation. 4.  Weight loss (approximately 35 pounds over 1 year). 5.  Tobacco abuse. POSTOPERATIVE DIAGNOSES:  1. Type 4 proximal gastric ulcer, superficial.  2.  Mild antral gastritis. 3.  Small hiatal hernia with grade 2 distal esophagitis. 4.  Normal colon, marginal prep. OPERATION PERFORMED:  1. Esophagogastroduodenoscopy. 2.  Distal esophageal biopsies, GE junction. 3.  Type 4 ulcer, margin biopsy, proximal stomach. 4.  Antral biopsies. 5.  Colonoscopy, anus to cecum. ANESTHESIA:  MAC.    ESTIMATED BLOOD LOSS:  Less than 20 mL. INDICATIONS:  The patient is a 81-year-old white female with a long  history of chronic abdominal pain, constipation, reflux symptoms,  history of stomach ulcer 7 years ago, weight loss of 30 pounds over the  last 12 months, chronic back pain, less than 1-pack-per-day smoker. At  this time, diagnostic endoscopy is indicated. OPERATIVE PROCEDURE:  After obtaining informed consent with discussion  of risks, benefits, and alternatives including a risk of GI bleeding,  perforation, missed lesions, COVID-19 exposure/infection, etc., the  patient was taken to the endoscopy suite and placed in the left lateral  recumbent position.   Following adequate IV sedation, an endoscope was were required. The patient tolerated the procedure well and was  transferred to PACU in stable condition. SPECIMENS:  1.  GE junction biopsy. 2.  Prepyloric antral biopsies. 3.  Type 4 proximal gastric ulcer margin biopsy. DRAINS:  None. COMPLICATIONS:  None. DISPOSITION:  To PACU awake, alert and stable. Following recovery, we  will discharge the patient home with gradual advancement of diet and  activity as tolerated with instructions for a bland diet, continuation  of proton pump inhibition. Prescription for Carafate provided. We will  check serum gastrin level. We will strongly encourage the patient's  complete tobacco cessation. Followup will be with me in 1 to 2 weeks to  review pathology. We will recommend beginning screening colonoscopy at  age 40.         CARLITA Lancaster    D: 01/25/2021 9:05:21       T: 01/25/2021 9:09:09     [id number]  Job#: [id number]     Doc#: [id number]    CC:  Valerie Main

## 2021-01-25 NOTE — PROGRESS NOTES
Hematological: Does not bruise/bleed easily. Psychiatric/Behavioral: Positive for dysphoric mood. Negative for confusion and sleep disturbance. Past Medical History:   Diagnosis Date    Bipolar affective disorder ([address]     Depression     GERD (gastroesophageal reflux disease)     Hiatal hernia     Irritable bowel syndrome     Low back pain        Past Surgical History:   Procedure Laterality Date     SECTION      HYSTERECTOMY      STERILIZATION      Essure    UPPER GASTROINTESTINAL ENDOSCOPY N/A 2019    EGD BIOPSY and clotest performed by Roma Gordillo MD at [address]       History reviewed. No pertinent family history. Allergies:  See list    Current Outpatient Medications   Medication Sig Dispense Refill    pantoprazole (PROTONIX) 40 MG tablet take 1 tablet by mouth once daily 30 tablet 3    gabapentin (NEURONTIN) 300 MG capsule gabapentin 300 mg capsule       No current facility-administered medications for this visit. Social History     Socioeconomic History    Marital status:      Spouse name: None    Number of children: None    Years of education: None    Highest education level: None   Occupational History    None   Social Needs    Financial resource strain: None    Food insecurity     Worry: None     Inability: None    Transportation needs     Medical: None     Non-medical: None   Tobacco Use    Smoking status: Current Every Day Smoker     Packs/day: 0.50     Types: Cigarettes    Smokeless tobacco: Never Used   Substance and Sexual Activity    Alcohol use:  Yes     Alcohol/week: 0.0 - 1.0 standard drinks    Drug use: Not Currently    Sexual activity: Yes     Partners: Male   Lifestyle    Physical activity     Days per week: None     Minutes per session: None    Stress: None   Relationships    Social connections     Talks on phone: None     Gets together: None     Attends Jew service: None Active member of club or organization: None     Attends meetings of clubs or organizations: None     Relationship status: None    Intimate partner violence     Fear of current or ex partner: None     Emotionally abused: None     Physically abused: None     Forced sexual activity: None   Other Topics Concern    None   Social History Narrative    None       /72 (Site: Left Upper Arm, Position: Sitting, Cuff Size: Medium Adult)   Pulse 78   Temp 97.9 °F (36.6 °C) (Infrared)   Resp 18   Ht 5' 6\" (1.676 m)   Wt 177 lb (80.3 kg)   LMP 10/07/2018 (Approximate)   BMI 28.57 kg/m²      Physical Exam  Vitals signs and nursing note reviewed. Constitutional:       Appearance: She is well-developed. HENT:      Head: Normocephalic and atraumatic. Eyes:      General: No scleral icterus. Conjunctiva/sclera: Conjunctivae normal.      Pupils: Pupils are equal, round, and reactive to light. Neck:      Musculoskeletal: Normal range of motion and neck supple. Vascular: No JVD. Trachea: No tracheal deviation. Cardiovascular:      Rate and Rhythm: Normal rate and regular rhythm. Pulmonary:      Effort: Pulmonary effort is normal. No respiratory distress. Chest:      Chest wall: No tenderness. Abdominal:      General: There is no distension. Palpations: Abdomen is soft. There is no mass. Tenderness: There is no abdominal tenderness. There is no guarding or rebound. Musculoskeletal: Normal range of motion. Lymphadenopathy:      Cervical: No cervical adenopathy. Skin:     General: Skin is warm and dry. Findings: No erythema or rash. Neurological:      Mental Status: She is alert and oriented to person, place, and time. Cranial Nerves: No cranial nerve deficit. Psychiatric:         Behavior: Behavior normal.         Thought Content: Thought content normal.         Judgment: Judgment normal.         ASSESSMENT     Diagnosis Orders   1.  Chronic abdominal pain 2. Chronic constipation     3. History of peptic ulcer     4. Gastroesophageal reflux disease, unspecified whether esophagitis present     5. BMI 28.0-28.9,adult     6. Weight loss     7. Tobacco abuse         PLAN    Will proceed with diagnostic endoscopy. Risks, benefits, alternatives thoroughly reviewed and accepted by Ms Christinejoesph Keysha, including GI bleeding, perforation, missed lesions, COVID-19 exposure/infection, etc.  Encouraged tobacco cessation. [place] diet.      Jeannie Valentine MD

## 2021-01-25 NOTE — ANESTHESIA PRE PROCEDURE
Department of Anesthesiology  Preprocedure Note       Name:  Aung Thomas   Age:  30 y.o.  :  1985                                          MRN:  [id number]         Date:  2021      Surgeon: Chelita Grissom):  Cesar Mansfield MD    Procedure: Procedure(s):  EGD  COLONOSCOPY    Medications prior to admission:   Prior to Admission medications    Medication Sig Start Date End Date Taking? Authorizing Provider   pantoprazole (PROTONIX) 40 MG tablet take 1 tablet by mouth once daily 20  Yes Cesar Mansfield MD   gabapentin (NEURONTIN) 300 MG capsule gabapentin 300 mg capsule 19  Yes Historical Provider, MD       Current medications:    Current Facility-Administered Medications   Medication Dose Route Frequency Provider Last Rate Last Admin    lactated ringers infusion   Intravenous Continuous Cesar Mansfield  mL/hr at 21 0811 New Bag at 21 0811    sodium chloride flush 0.9 % injection 10 mL  10 mL Intravenous 2 times per day Cesar Mansfield MD        sodium chloride flush 0.9 % injection 10 mL  10 mL Intravenous PRN Cesar Mansfield MD           Allergies:     Allergies   Allergen Reactions    Keflex [Cephalexin] Nausea Only    Flagyl [Metronidazole] Nausea And Vomiting       Problem List:    Patient Active Problem List   Diagnosis Code    Gastroesophageal reflux disease with esophagitis K21.00       Past Medical History:        Diagnosis Date    Bipolar affective disorder ([address]     Depression     GERD (gastroesophageal reflux disease)     Hiatal hernia     Irritable bowel syndrome     Low back pain        Past Surgical History:        Procedure Laterality Date     SECTION      HYSTERECTOMY      STERILIZATION      Essure    UPPER GASTROINTESTINAL ENDOSCOPY N/A 2019    EGD BIOPSY and clotest performed by Cesar Mansfield MD at [address] [place] [address] History:    Social History     Tobacco Use    Smoking status: Current Every Day Smoker     Packs/day: 0.50 Types: Cigarettes    Smokeless tobacco: Never Used   Substance Use Topics    Alcohol use: Yes     Alcohol/week: 0.0 - 1.0 standard drinks                                Ready to quit: Not Answered  Counseling given: Not Answered      Vital Signs (Current):   Vitals:    01/25/21 0746 01/25/21 0756   BP:  (!) 111/55   Pulse:  56   Resp:  20   Temp:  36.9 °C (98.5 °F)   TempSrc:  Temporal   SpO2:  99%   Weight: 171 lb (77.6 kg)    Height: 5' 6\" (1.676 m)                                               BP Readings from Last 3 Encounters:   01/25/21 (!) 111/55   01/04/21 120/72   10/13/20 129/68       NPO Status: Time of last liquid consumption: 2300                        Time of last solid consumption: 2300                        Date of last liquid consumption: 01/24/21                        Date of last solid food consumption: 01/23/21    BMI:   Wt Readings from Last 3 Encounters:   01/25/21 171 lb (77.6 kg)   01/04/21 177 lb (80.3 kg)   10/13/20 185 lb (83.9 kg)     Body mass index is 27.6 kg/m². CBC:   Lab Results   Component Value Date    WBC 10.3 05/19/2020    RBC 4.12 05/19/2020    HGB 12.5 05/19/2020    HCT 38.9 05/19/2020    MCV 94.4 05/19/2020    RDW 14.3 05/19/2020     05/19/2020       CMP:   Lab Results   Component Value Date     05/19/2020    K 4.2 05/19/2020     05/19/2020    CO2 22 05/19/2020    BUN 6 05/19/2020    CREATININE 0.61 05/19/2020    GFRAA >60 05/19/2020    LABGLOM >60 05/19/2020    GLUCOSE 90 05/19/2020    PROT 7.1 05/19/2020    CALCIUM 9.3 05/19/2020    BILITOT 0.18 05/19/2020    ALKPHOS 62 05/19/2020    AST 11 05/19/2020    ALT 11 05/19/2020       POC Tests: No results for input(s): POCGLU, POCNA, POCK, POCCL, POCBUN, POCHEMO, POCHCT in the last 72 hours.     Coags:   Lab Results   Component Value Date    PROTIME 9.9 04/19/2018    INR 0.9 04/19/2018       HCG (If Applicable):   Lab Results   Component Value Date    PREGTESTUR NEGATIVE 04/16/2018 ABGs: No results found for: PHART, PO2ART, XJN6IET, OAY2ZKN, BEART, G5EGBFNV     Type & Screen (If Applicable):  No results found for: LABABO, LABRH    Drug/Infectious Status (If Applicable):  No results found for: HIV, HEPCAB    COVID-19 Screening (If Applicable):   Lab Results   Component Value Date    COVID19 Not Detected 01/25/2021         Anesthesia Evaluation  Patient summary reviewed and Nursing notes reviewed  Airway: Mallampati: II  TM distance: >3 FB     Mouth opening: > = 3 FB Dental:      Comment: Cracked at pencil marks    Pulmonary:normal exam  breath sounds clear to auscultation  (+) current smoker                           Cardiovascular:Negative CV ROS  Exercise tolerance: good (>4 METS),         ECG reviewed  Rhythm: regular  Rate: normal                    Neuro/Psych:   (+) psychiatric history (history of Bipolar):depression/anxiety             GI/Hepatic/Renal:   (+) hiatal hernia, GERD: well controlled, bowel prep,           Endo/Other: Negative Endo/Other ROS                    Abdominal:       Abdomen: soft. Vascular: negative vascular ROS. Anesthesia Plan      MAC     ASA 2       Induction: intravenous. Anesthetic plan and risks discussed with patient. Plan discussed with attending.                   RAE Sharma - CRNA   1/25/2021

## 2021-01-25 NOTE — ANESTHESIA POSTPROCEDURE EVALUATION
Department of Anesthesiology  Postprocedure Note    Patient: Ramila Prasad  MRN: [id number]  YOB: 1985  Date of evaluation: 1/25/2021  Time:  10:34 AM     Procedure Summary     Date: 01/25/21 Room / Location: [address] [place] [address] ENDOSCOPY    Anesthesia Start: 5416 Anesthesia Stop: 1142    Procedures:       EGD BIOPSY (N/A )      COLONOSCOPY (N/A Abdomen) Diagnosis: (HISTORY ESPOHAGITIS, GASTRITIC, GERD, CONSTIPATION, WEIGHT LOSS)    Surgeons: Marilyn Madsen MD Responsible Provider: RAE Andrews CRNA    Anesthesia Type: MAC ASA Status: 2          Anesthesia Type: MAC    Bhavik Phase I: Bhavik Score: 10    Bhavik Phase II: Bhavik Score: 10    Last vitals: Reviewed and per EMR flowsheets.        Anesthesia Post Evaluation    Patient location during evaluation: PACU  Patient participation: complete - patient participated  Level of consciousness: awake and alert  Pain score: 0  Airway patency: patent  Nausea & Vomiting: no nausea and no vomiting  Complications: no  Cardiovascular status: blood pressure returned to baseline and hemodynamically stable  Respiratory status: acceptable, room air and spontaneous ventilation  Hydration status: euvolemic

## 2021-01-25 NOTE — PATIENT INSTRUCTIONS
Patient Education        Upper GI Endoscopy: Before Your Procedure  What is an upper GI endoscopy? An upper gastrointestinal (or GI) endoscopy is a test that allows your doctor to look at the inside of your esophagus, stomach, and the first part of your small intestine, called the duodenum. The esophagus is the tube that carries food to your stomach. The doctor uses a thin, lighted tube that bends. It is called an endoscope, or scope. The doctor puts the tip of the scope in your mouth and gently moves it down your throat. The scope is a flexible video camera. The doctor looks at a monitor (like a TV set or a computer screen) as he or she moves the scope. A doctor may do this procedure to look for ulcers, tumors, infection, or bleeding. It also can be used to look for signs of acid backing up into your esophagus. This is called gastroesophageal reflux disease, or GERD. The doctor can use the scope to take a sample of tissue for study (a biopsy). The doctor also can use the scope to take out growths or stop bleeding. Follow-up care is a key part of your treatment and safety. Be sure to make and go to all appointments, and call your doctor if you are having problems. It's also a good idea to know your test results and keep a list of the medicines you take. How do you prepare for the procedure? Procedures can be stressful. This information will help you understand what you can expect. And it will help you safely prepare for your procedure. Preparing for the procedure    · Do not eat or drink anything for 6 to 8 hours before the test. An empty stomach helps your doctor see your stomach clearly during the test. It also reduces your chances of vomiting. If you vomit, there is a small risk that the vomit could enter your lungs. (This is called aspiration.) If the test is done in an emergency, a tube may be inserted through your nose or mouth to empty your stomach.   · Do not take sucralfate (Carafate) or antacids on the day of the test. These medicines can make it hard for your doctor to see your upper GI tract.     · If your doctor tells you to, stop taking iron supplements 7 to 14 days before the test.     · Be sure you have someone to take you home. Anesthesia and pain medicine will make it unsafe for you to drive or get home on your own.     · Understand exactly what procedure is planned, along with the risks, benefits, and other options. · Tell your doctor ALL the medicines, vitamins, supplements, and herbal remedies you take. Some may increase the risk of problems during your procedure. Your doctor will tell you if you should stop taking any of them before the procedure and how soon to do it.     · If you take aspirin or some other blood thinner, ask your doctor if you should stop taking it before your procedure. Make sure that you understand exactly what your doctor wants you to do. These medicines increase the risk of bleeding.     · Make sure your doctor and the hospital have a copy of your advance directive. If you don't have one, you may want to prepare one. It lets others know your health care wishes. It's a good thing to have before any type of surgery or procedure. What happens on the day of the procedure? · Follow the instructions exactly about when to stop eating and drinking. If you don't, your procedure may be canceled. If your doctor told you to take your medicines on the day of the procedure, take them with only a sip of water.     · Take a bath or shower before you come in for your procedure. Do not apply lotions, perfumes, deodorants, or nail polish.     · Take off all jewelry and piercings. And take out contact lenses, if you wear them. At the hospital or surgery center   · Bring a picture ID.     · The test may take 15 to 30 minutes.   · The doctor may spray medicine on the back of your throat to numb it. You also will get medicine to prevent pain and to relax you.     · You will lie on your left side. The doctor will put the scope in your mouth and toward the back of your throat. The doctor will tell you when to swallow. This helps the scope move down your throat. You will be able to breathe normally. The doctor will move the scope down your esophagus into your stomach. The doctor also may look at the duodenum.     · If your doctor wants to take a sample of tissue for a biopsy, he or she may use small surgical tools, which are put into the scope, to cut off some tissue. You will not feel a biopsy, if one is taken. The doctor also can use the tools to stop bleeding or to do other treatments, if needed.     · You will stay at the hospital or surgery center for 1 to 2 hours until the medicine you were given wears off. What happens after an upper GI endoscopy? · After the test, you may belch and feel bloated for a while.     · You may have a tickling, dry throat or mouth. You may feel a bit hoarse, and you may have a mild sore throat. These symptoms may last several days. Throat lozenges and warm saltwater gargles can help relieve the throat symptoms.     · Don't drive or operate machinery for 12 hours after the test.     · Your doctor will tell you when you can go back to your usual diet and activities.     · Don't drink alcohol for 12 to 24 hours after the test.   When should you call your doctor? · You have questions or concerns.     · You don't understand how to prepare for your procedure.     · You become ill before the procedure (such as fever, flu, or a cold).     · You need to reschedule or have changed your mind about having the procedure. Where can you learn more? Go to [email or web address] and sign in to your [employer] account. Enter P790 in the [facility] box to learn more about \"Upper GI Endoscopy: Before Your Procedure. \"     If you do not have an account, please click on the \"Sign Up Now\" link. Current as of: April 15, 2020               Content Version: 12.6  © 2006-2020 [employer]. Care instructions adapted under license by [facility] [place] ([facility]). If you have questions about a medical condition or this instruction, always ask your healthcare professional. [place] [address] any warranty or liability for your use of this information. Patient Education        Learning About Colonoscopy  What is a colonoscopy? A colonoscopy is a test (also called a procedure) that lets a doctor look inside your large intestine. The doctor uses a thin, lighted tube called a colonoscope. The doctor uses it to look for small growths called polyps, colon or rectal cancer (colorectal cancer), or other problems like bleeding. During the procedure, the doctor can take samples of tissue. The samples can then be checked for cancer or other conditions. The doctor can also take out polyps. How is a colonoscopy done? This procedure is done in a doctor's office or a clinic or hospital. You will get medicine to help you relax and not feel pain. Some people find that they don't remember having the test because of the medicine. The doctor gently moves the colonoscope, or scope, through the colon. The scope is also a small video camera. It lets the doctor see the colon and take pictures. How do you prepare for the procedure? You need to clean out your colon before the procedure so the doctor can see all of your colon. This process may start a day or two before the test. This depends on which \"colon prep\" your doctor recommends. To clean your colon, you stop eating solid foods and drink only clear liquids. You can have water, tea, coffee, clear juices, clear broths, flavored ice pops, and gelatin (such as Jell-O). Do not drink anything red or purple. The day or night before the procedure, you drink a large amount of a special liquid. This causes loose, frequent stools. You will go to the bathroom a lot. It's very important to drink all of the liquid. If you have problems drinking it, call your doctor. Some people don't go to work or do their usual activities on the day of the prep. Arrange to have someone take you home after the test.  What can you expect after a colonoscopy? Your doctor will tell you when you can eat and do your usual activities. Drink a lot of fluid after the test to replace the fluids you may have lost during the colon prep. But don't drink alcohol. Your doctor will talk to you about when you'll need your next colonoscopy. The results of your test and your risk for colorectal cancer will help your doctor decide how often you need to be checked. After the test, you may be bloated or have gas pains. You may need to pass gas. If a biopsy was done or a polyp was removed, you may have streaks of blood in your stool (feces) for a few days. If polyps were taken out, your doctor may tell you to avoid taking aspirin and nonsteroidal anti-inflammatory drugs (NSAIDs) for 7 to 14 days. Problems such as heavy rectal bleeding may not occur until several weeks after the test. This isn't common. But it can happen after polyps are removed. Follow-up care is a key part of your treatment and safety. Be sure to make and go to all appointments, and call your doctor if you are having problems. It's also a good idea to know your test results and keep a list of the medicines you take. Where can you learn more?

## 2021-01-25 NOTE — BRIEF OP NOTE
Brief Postoperative Note      Patient: Arnol Alexander  YOB: 1985  MRN: [id number]    Date of Procedure: 1/25/2021    Pre-Op Diagnosis:      1. Chronic abdominal pain     2. History of gastric ulcer     3. Constipation     4. Weight loss     5. Tobacco abuse    Post-Op Diagnosis:     1. Type IV proximal gastric ulcer, superficial     2. Mild antral gastritis     3. Small hiatal hernia with Grade 2 esophagitis     4. Normal colon, marginal prep       Operation:     1. EGD     2. Distal esophageal biopsies, GE junction     3. Type IV ulcer margin biopsy, proximal stomach     4. Antral biopsies     5. Colonoscopy anus to cecum    Surgeon(s):  Taylor Krishnamurthy MD    Assistant:  * No surgical staff found *    Anesthesia: Monitor Anesthesia Care    Estimated Blood Loss (mL): less than 94VI     Complications: None    Specimens:   ID Type Source Tests Collected by Time Destination   1 : clotest Tissue Stomach H. PYLORI DETECTION Taylor Krishnamurthy MD 1/25/2021 1123    A : antrum bites x3 bx Tissue Stomach SURGICAL PATHOLOGY Taylor Krishnamurthy MD 1/25/2021 1686    B : fundic ulcer margin Tissue Stomach SURGICAL PATHOLOGY Taylor Krishnamurthy MD 1/25/2021 3201    C : distal esophagitis, ge junction Tissue Stomach SURGICAL PATHOLOGY Taylor Krishnamurthy MD 1/25/2021 7892      Findings:   As above.     Dictated # [id number]    Electronically signed by Taylor Krishnamurthy MD on 1/25/2021 at 8:54 AM

## 2021-01-26 ENCOUNTER — TELEPHONE (OUTPATIENT)
Dept: PRIMARY CARE CLINIC | Age: 36
End: 2021-01-26

## 2021-01-26 LAB
DIRECT EXAM: NEGATIVE
Lab: NORMAL
SPECIMEN DESCRIPTION: NORMAL
SURGICAL PATHOLOGY REPORT: NORMAL

## 2021-01-27 LAB — GASTRIN: 23 PG/ML (ref 0–100)

## 2021-06-28 ENCOUNTER — HOSPITAL ENCOUNTER (OUTPATIENT)
Age: 36
Setting detail: SPECIMEN
Discharge: HOME | End: 2021-06-28
Payer: MEDICARE

## 2021-06-28 LAB
ABSOLUTE EOS #: 0.2 K/UL (ref 0–0.44)
ABSOLUTE IMMATURE GRANULOCYTE: 0.03 K/UL (ref 0–0.3)
ABSOLUTE LYMPH #: 2.1 K/UL (ref 1.1–3.7)
ABSOLUTE MONO #: 0.4 K/UL (ref 0.1–1.2)
BASOPHILS # BLD: 0 % (ref 0–2)
BASOPHILS ABSOLUTE: 0.03 K/UL (ref 0–0.2)
DIFFERENTIAL TYPE: ABNORMAL
EOSINOPHILS RELATIVE PERCENT: 3 % (ref 1–4)
HCT VFR BLD CALC: 41.4 % (ref 36.3–47.1)
HEMOGLOBIN: 13 G/DL (ref 11.9–15.1)
IMMATURE GRANULOCYTES: 0 %
LYMPHOCYTES # BLD: 26 % (ref 24–43)
MCH RBC QN AUTO: 30.4 PG (ref 25.2–33.5)
MCHC RBC AUTO-ENTMCNC: 31.4 G/DL (ref 28.4–34.8)
MCV RBC AUTO: 97 FL (ref 82.6–102.9)
MONOCYTES # BLD: 5 % (ref 3–12)
NRBC AUTOMATED: 0 PER 100 WBC
PDW BLD-RTO: 13.5 % (ref 11.8–14.4)
PLATELET # BLD: 299 K/UL (ref 138–453)
PLATELET ESTIMATE: ABNORMAL
PMV BLD AUTO: 10.8 FL (ref 8.1–13.5)
RBC # BLD: 4.27 M/UL (ref 3.95–5.11)
RBC # BLD: ABNORMAL 10*6/UL
SEDIMENTATION RATE, ERYTHROCYTE: 14 MM (ref 0–20)
SEG NEUTROPHILS: 66 % (ref 36–65)
SEGMENTED NEUTROPHILS ABSOLUTE COUNT: 5.36 K/UL (ref 1.5–8.1)
WBC # BLD: 8.1 K/UL (ref 3.5–11.3)
WBC # BLD: ABNORMAL 10*3/UL

## 2021-06-29 LAB
% CKMB: 2.7 % (ref 0–3)
ALBUMIN SERPL-MCNC: 4.4 G/DL (ref 3.5–5.2)
ALBUMIN/GLOBULIN RATIO: 1.6 (ref 1–2.5)
ALP BLD-CCNC: 59 U/L (ref 35–104)
ALT SERPL-CCNC: 10 U/L (ref 5–33)
ANION GAP SERPL CALCULATED.3IONS-SCNC: 14 MMOL/L (ref 9–17)
AST SERPL-CCNC: 15 U/L
BILIRUB SERPL-MCNC: <0.1 MG/DL (ref 0.3–1.2)
BUN BLDV-MCNC: 8 MG/DL (ref 6–20)
BUN/CREAT BLD: ABNORMAL (ref 9–20)
C-REACTIVE PROTEIN: 4.2 MG/L (ref 0–5)
CALCIUM SERPL-MCNC: 9.4 MG/DL (ref 8.6–10.4)
CHLORIDE BLD-SCNC: 104 MMOL/L (ref 98–107)
CK MB: <1 NG/ML
CKMB INTERPRETATION: NORMAL
CO2: 22 MMOL/L (ref 20–31)
CREAT SERPL-MCNC: 0.55 MG/DL (ref 0.5–0.9)
ESTIMATED AVERAGE GLUCOSE: 97 MG/DL
FOLATE: 8.4 NG/ML
GFR AFRICAN AMERICAN: >60 ML/MIN
GFR NON-AFRICAN AMERICAN: >60 ML/MIN
GFR SERPL CREATININE-BSD FRML MDRD: ABNORMAL ML/MIN/{1.73_M2}
GFR SERPL CREATININE-BSD FRML MDRD: ABNORMAL ML/MIN/{1.73_M2}
GLUCOSE BLD-MCNC: 122 MG/DL (ref 70–99)
HBA1C MFR BLD: 5 % (ref 4–6)
HBV SURFACE AB TITR SER: 5.2 MIU/ML
HEPATITIS B CORE TOTAL ANTIBODY: NONREACTIVE
HEPATITIS C ANTIBODY: NONREACTIVE
HIV AG/AB: NONREACTIVE
HOMOCYSTEINE: 8.4 UMOL/L
MAGNESIUM: 1.5 MG/DL (ref 1.6–2.6)
POTASSIUM SERPL-SCNC: 4 MMOL/L (ref 3.7–5.3)
RHEUMATOID FACTOR: <10 IU/ML
SODIUM BLD-SCNC: 140 MMOL/L (ref 135–144)
T. PALLIDUM, IGG: NONREACTIVE
THYROXINE, FREE: 1.08 NG/DL (ref 0.93–1.7)
TOTAL CK: 37 U/L (ref 26–192)
TOTAL PROTEIN: 7.2 G/DL (ref 6.4–8.3)
TSH SERPL DL<=0.05 MIU/L-ACNC: 0.67 MIU/L (ref 0.3–5)
URIC ACID: 4.9 MG/DL (ref 2.4–5.7)
VITAMIN B-12: 509 PG/ML (ref 232–1245)

## 2021-06-30 LAB
ANTI DNA DOUBLE STRANDED: 0.7 IU/ML
ANTI-NUCLEAR ANTIBODY (ANA): NEGATIVE
CCP IGG ANTIBODIES: 0.6 U/ML (ref 0–7)
ENA ANTIBODIES SCREEN: 0.2 U/ML

## 2021-07-03 LAB — METHYLMALONIC ACID: 0.11 UMOL/L (ref 0–0.4)

## 2022-01-21 ENCOUNTER — OFFICE VISIT (OUTPATIENT)
Dept: SURGERY | Age: 37
End: 2022-01-21
Payer: MEDICARE

## 2022-01-21 VITALS
SYSTOLIC BLOOD PRESSURE: 111 MMHG | TEMPERATURE: 99.7 F | HEART RATE: 89 BPM | DIASTOLIC BLOOD PRESSURE: 73 MMHG | HEIGHT: 66 IN | WEIGHT: 166 LBS | BODY MASS INDEX: 26.68 KG/M2

## 2022-01-21 DIAGNOSIS — K21.9 GASTROESOPHAGEAL REFLUX DISEASE, UNSPECIFIED WHETHER ESOPHAGITIS PRESENT: Primary | ICD-10-CM

## 2022-01-21 DIAGNOSIS — R10.9 CHRONIC ABDOMINAL PAIN: ICD-10-CM

## 2022-01-21 DIAGNOSIS — G89.29 CHRONIC ABDOMINAL PAIN: ICD-10-CM

## 2022-01-21 DIAGNOSIS — Z87.11 HISTORY OF PEPTIC ULCER: ICD-10-CM

## 2022-01-21 PROCEDURE — 99213 OFFICE O/P EST LOW 20 MIN: CPT | Performed by: SURGERY

## 2022-01-21 PROCEDURE — G8484 FLU IMMUNIZE NO ADMIN: HCPCS | Performed by: SURGERY

## 2022-01-21 PROCEDURE — G8419 CALC BMI OUT NRM PARAM NOF/U: HCPCS | Performed by: SURGERY

## 2022-01-21 PROCEDURE — 4004F PT TOBACCO SCREEN RCVD TLK: CPT | Performed by: SURGERY

## 2022-01-21 PROCEDURE — G8427 DOCREV CUR MEDS BY ELIG CLIN: HCPCS | Performed by: SURGERY

## 2022-01-21 RX ORDER — PANTOPRAZOLE SODIUM 40 MG/1
TABLET, DELAYED RELEASE ORAL
Qty: 30 TABLET | Refills: 3 | Status: SHIPPED | OUTPATIENT
Start: 2022-01-21

## 2022-01-21 RX ORDER — SUCRALFATE ORAL 1 G/10ML
1 SUSPENSION ORAL 4 TIMES DAILY
Qty: 420 ML | Refills: 1 | Status: SHIPPED | OUTPATIENT
Start: 2022-01-21

## 2022-01-21 RX ORDER — QUETIAPINE FUMARATE 100 MG/1
100 TABLET, FILM COATED ORAL 2 TIMES DAILY
COMMUNITY

## 2022-02-06 ASSESSMENT — ENCOUNTER SYMPTOMS
SORE THROAT: 0
ABDOMINAL DISTENTION: 1
BACK PAIN: 0
BLOOD IN STOOL: 0
TROUBLE SWALLOWING: 0
ABDOMINAL PAIN: 1
COUGH: 0
SHORTNESS OF BREATH: 0
NAUSEA: 0
VOMITING: 0
CHOKING: 0

## 2022-02-06 NOTE — PROGRESS NOTES
Clau Cruz MD  General Surgery, Endoscopy  Chief Medical Officer    [address] [facility]  [address] [address]  Dept: [phone]  Fax: 73 Debbie Calvert  Chief Complaint   Patient presents with    Other     Patient quit smoking one year ago and gained weight. Since then has had reflux. Taking OTC prilosec which helps. Patient has also had constipation. HPI    Ms Maria T Black is a pleasant 78-year-old white female with history of type for proximal gastric ulcer, hiatal hernia and normal colon on endoscopy January 2021 who presents for reevaluation. She has chronic reflux symptoms, which have worsened over the last few months. She has gained weight since discontinuing tobacco use. 166 pounds, BMI 27. Normal appetite. Daily bowel movements, formed and brown, without blood. No cough, fever nor other respiratory symptoms. Chronic back pain. No family history of colon cancer. DATE OF PROCEDURE:  01/25/2021     ATTENDING SURGEON:  Navya Walden MD     PCP:  Noé Emery CNP     PREOPERATIVE DIAGNOSES:  1. Chronic abdominal pain. 2.  History of gastric ulcer. 3.  Chronic constipation. 4.  Weight loss (approximately 35 pounds over 1 year). 5.  Tobacco abuse.     POSTOPERATIVE DIAGNOSES:  1. Type 4 proximal gastric ulcer, superficial.  2.  Mild antral gastritis. 3.  Small hiatal hernia with grade 2 distal esophagitis. 4.  Normal colon, marginal prep.     OPERATION PERFORMED:  1. Esophagogastroduodenoscopy. 2.  Distal esophageal biopsies, GE junction. 3.  Type 4 ulcer, margin biopsy, proximal stomach. 4.  Antral biopsies. 5.  Colonoscopy, anus to cecum.      INDICATIONS:  The patient is a 61-year-old white female with a long  history of chronic abdominal pain, constipation, reflux symptoms,  history of stomach ulcer 7 years ago, weight loss of 30 pounds over the  last 12 months, chronic back pain, less than 1-pack-per-day smoker. At  this time, diagnostic endoscopy is indicated. Review of Systems   Constitutional: Negative for activity change, appetite change, chills, fever and unexpected weight change. HENT: Negative for nosebleeds, sneezing, sore throat and trouble swallowing. Eyes: Negative for visual disturbance. Respiratory: Negative for cough, choking and shortness of breath. Cardiovascular: Negative for chest pain, palpitations and leg swelling. Gastrointestinal: Positive for abdominal distention and abdominal pain. Negative for blood in stool, nausea and vomiting. Genitourinary: Negative for dysuria, flank pain and hematuria. Musculoskeletal: Negative for back pain, gait problem and myalgias. Allergic/Immunologic: Negative for immunocompromised state. Neurological: Negative for dizziness, seizures, syncope, weakness and headaches. Hematological: Does not bruise/bleed easily. Psychiatric/Behavioral: Positive for dysphoric mood. Negative for confusion and sleep disturbance. Past Medical History:   Diagnosis Date    Bipolar affective disorder ([facility] [address]     Depression     GERD (gastroesophageal reflux disease)     Hiatal hernia     Irritable bowel syndrome     Low back pain        Past Surgical History:   Procedure Laterality Date     SECTION      COLONOSCOPY N/A 2021    COLONOS[facility] performed by Danica [facility]as [facility] at [facility] 2012    Essure    UPPER GASTROINTESTINAL ENDOSCOPY N/A 2019    EGD BIOPSY and clotest performed by Danica Elias MD at [facility] 2021    EGD BIOPSY performed by [facility]as MD at [facility] ENDOSCOPY       No family history on file.     Allergies:  See list    Current Outpatient Medications   Medication Sig Dispense Refill    Cyclobenzaprine HCl (FLEXERIL PO) Take by mouth      QUEtiapine (SEROQUEL) 100 MG tablet Take 100 mg by mouth 2 times daily      pantoprazole (PROTONIX) 40 MG tablet take 1 tablet by mouth once daily 30 tablet 3    sucralfate (CARAFATE) 1 GM/10ML suspension Take 10 mLs by mouth 4 times daily May substitute 1 gm Carafate tabs, if pharmacy staff instruct patient how to create slurry at home. 420 mL 1    gabapentin (NEURONTIN) 300 MG capsule gabapentin 300 mg capsule       No current facility-administered medications for this visit. Social History     Socioeconomic History    Marital status:      Spouse name: Not on file    Number of children: Not on file    Years of education: Not on file    Highest education level: Not on file   Occupational History    Not on file   Tobacco Use    Smoking status: Former Smoker     Packs/day: 0.50     Types: Cigarettes    Smokeless tobacco: Never Used   Vaping Use    Vaping Use: Never used   Substance and Sexual Activity    Alcohol use: Yes     Alcohol/week: 0.0 - 1.0 standard drinks    Drug use: Not Currently    Sexual activity: Yes     Partners: Male   Other Topics Concern    Not on file   Social History Narrative    Not on file     Social Determinants of Health     Financial Resource Strain:     Difficulty of Paying Living Expenses: Not on file   Food Insecurity:     Worried About Running Out of Food in the Last Year: Not on file    Shailesh of Food in the Last Year: Not on file   Transportation Needs:     Lack of Transportation (Medical): Not on file    Lack of Transportation (Non-Medical):  Not on file   Physical Activity:     Days of Exercise per Week: Not on file    Minutes of Exercise per Session: Not on file   Stress:     Feeling of Stress : Not on file   Social Connections:     Frequency of Communication with Friends and Family: Not on file    Frequency of Social Gatherings with Friends and Family: Not on file    Attends Druze Services: Not on file    Active Member of Clubs or Organizations: Not on file    Attends Club or Organization Meetings: Not on file    Marital Status: Not on file   Intimate Partner Violence:     Fear of Current or Ex-Partner: Not on file    Emotionally Abused: Not on file    Physically Abused: Not on file    Sexually Abused: Not on file   Housing Stability:     Unable to Pay for Housing in the Last Year: Not on file    Number of [place] in the Last Year: Not on file    Unstable Housing in the Last Year: Not on file       /73   Pulse 89   Temp 99.7 °F (37.6 °C)   Ht 5' 6\" (1.676 m)   Wt 166 lb (75.3 kg)   LMP 10/07/2018 (Approximate)   BMI 26.79 kg/m²      Physical Exam  Vitals and nursing note reviewed. Constitutional:       Appearance: She is well-developed. HENT:      Head: Normocephalic and atraumatic. Eyes:      General: No scleral icterus. Conjunctiva/sclera: Conjunctivae normal.      Pupils: Pupils are equal, round, and reactive to light. Neck:      Vascular: No JVD. Trachea: No tracheal deviation. Cardiovascular:      Rate and Rhythm: Normal rate and regular rhythm. Pulmonary:      Effort: Pulmonary effort is normal. No respiratory distress. Chest:      Chest wall: No tenderness. Abdominal:      General: There is no distension. Palpations: Abdomen is soft. There is no mass. Tenderness: There is no abdominal tenderness. There is no guarding or rebound. Musculoskeletal:         General: Normal range of motion. Cervical back: Normal range of motion and neck supple. Lymphadenopathy:      Cervical: No cervical adenopathy. Skin:     General: Skin is warm and dry. Findings: No erythema or rash. Neurological:      Mental Status: She is alert and oriented to person, place, and time. Cranial Nerves: No cranial nerve deficit. Psychiatric:         Behavior: Behavior normal.         Thought Content:  Thought content normal.         Judgment: Judgment normal.         IMAGING/LABS    Contains abnormal data CBC Auto Differential  Order: [id number]   Status: Edited Result - FINAL     Visible to patient: Yes (seen)     Next appt: None     0 Result Notes    Component Ref Range & Units 6/28/21 1306 5/19/20 1010 10/26/19 0910 4/19/18 2238 11/28/16 1026 8/13/14 1024   WBC 3.5 - 11.3 k/uL 8.1  10.3  9.8  8.7  9.7 R  8.6 [facility]    RBC 3.95 - 5.11 m/uL 4.27  4.12  4.48  4.30  4.47 R  4.33 [facility]    Hemoglobin 11.9 - 15.1 g/dL 13.0  12.5  12.9  12.5  12.3 R  13.3 [facility]    Hematocrit 36.3 - 47.1 % 41.4  38.9  40.6  38.5  38.4 R  39.7 [facility]    MCV 82.6 - 102.9 fL 97.0  94.4  90.6  89.5  85.8 R  91.6 [facility]    MCH 25.2 - 33.5 pg 30.4  30.3  28.8  29.1  27.4 R  30.7 [facility]    MCHC 28.4 - 34.8 g/dL 31.4  32.1  31.8  32.5  32.0 R  33.5 [facility]    RDW 11.8 - 14.4 % 13.5  14.3  16.7 High   16.3 High   18.7 High  R  15.2 [facility]    Platelets 387 - 740 k/uL 299  289  287  367  304 R  259 [facility]    MPV 8.1 - 13.5 fL 10.8  10.2  10.6  10.3  9.2 R, CM  9.5 [facility]    NRBC Automated 0.0 per 100 WBC 0.0  0.0  0.0  0.0      Differential Type  NOT REPORTED  NOT REPORTED  NOT REPORTED  NOT REPORTED   NOT REPORTED [1]    WBC Morphology  NOT REPORTED  NOT REPORTED  NOT REPORTED  NOT REPORTED   NOT REPORTED [1]    RBC Morphology  NOT REPORTED  NOT REPORTED  NOT REPORTED  NOT REPORTED   NOT REPORTED [1]    Platelet Estimate  NOT REPORTED  NOT REPORTED  NOT REPORTED  NOT REPORTED   NOT REPORTED [1]    Seg Neutrophils 36 - 65 % 66 High   64  68 High   53   62 [facility]    Lymphocytes 24 - 43 % 26  27  25  36   26 [facility]    Monocytes 3 - 12 % 5  6  6  7   7 [facility]    Eosinophils % 1 - 4 % 3  2  1  3   4 [facility]    Basophils 0 - 2 % 0  1  0  1   1 [facility]             ASSESSMENT     Diagnosis Orders   1. Gastroesophageal reflux disease, unspecified whether esophagitis present  pantoprazole (PROTONIX) 40 MG tablet    sucralfate (CARAFATE) 1 GM/10ML suspension   2. History of peptic ulcer     3. Chronic abdominal pain  US GALLBLADDER RUQ   4.  BMI 26.0-26.9,adult         PLAN    Discussed at length with Ms. Zach her worsening symptoms of epigastric pain and reflux. Prescriptions for Protonix and Carafate provided. Gallbladder ultrasound also requested. If her symptoms worsen, or fail to resolve, will proceed with diagnostic EGD. Risks, benefits, alternatives thoroughly reviewed and accepted by Ms. Serrano including GI bleeding, perforation, missed lesions, COVID-19 exposure/infection, etc.  Continue healthy, balanced reasonably high-fiber low-fat diet, increased water intake and physical activity, decrease calories and sugar, etc.     Agustin Stringer MD

## 2022-02-06 NOTE — PATIENT INSTRUCTIONS
Patient Education        Upper GI Endoscopy: Before Your Procedure  What is an upper GI endoscopy? An upper gastrointestinal (or GI) endoscopy is a test that allows your doctor to look at the inside of your esophagus, stomach, and the first part of your small intestine, called the duodenum. The esophagus is the tube that carries food to your stomach. The doctor uses a thin, lighted tube that bends. It is called an endoscope, or scope. The doctor puts the tip of the scope in your mouth and gently moves it down your throat. The scope is a flexible video camera. The doctor looks at a monitor (like a TV set or a computer screen) as he or she moves the scope. A doctor may do this procedure to look for ulcers, tumors, infection, or bleeding. It also can be used to look for signs of acid backing up into your esophagus. This is called gastroesophageal reflux disease, or GERD. The doctor can use the scope to take a sample of tissue for study (a biopsy). The doctor also can use the scope to take out growths or stop bleeding. Follow-up care is a key part of your treatment and safety. Be sure to make and go to all appointments, and call your doctor if you are having problems. It's also a good idea to know your test results and keep a list of the medicines you take. How do you prepare for the procedure? Procedures can be stressful. This information will help you understand what you can expect. And it will help you safely prepare for your procedure. Preparing for the procedure    · Do not eat or drink anything for 6 to 8 hours before the test. An empty stomach helps your doctor see your stomach clearly during the test. It also reduces your chances of vomiting. If you vomit, there is a small risk that the vomit could enter your lungs.  (This is called aspiration.) If the test is done in an emergency, a tube may be inserted through your nose or mouth to empty your stomach.     · Do not take sucralfate (Carafate) or antacids on the day of the test. These medicines can make it hard for your doctor to see your upper GI tract.     · If your doctor tells you to, stop taking iron supplements 7 to 14 days before the test.     · Be sure you have someone to take you home. Anesthesia and pain medicine will make it unsafe for you to drive or get home on your own.     · Understand exactly what procedure is planned, along with the risks, benefits, and other options. · Tell your doctor ALL the medicines, vitamins, supplements, and herbal remedies you take. Some may increase the risk of problems during your procedure. Your doctor will tell you if you should stop taking any of them before the procedure and how soon to do it.     · If you take aspirin or some other blood thinner, ask your doctor if you should stop taking it before your procedure. Make sure that you understand exactly what your doctor wants you to do. These medicines increase the risk of bleeding.     · Make sure your doctor and the hospital have a copy of your advance directive. If you don't have one, you may want to prepare one. It lets others know your health care wishes. It's a good thing to have before any type of surgery or procedure. What happens on the day of the procedure? · Follow the instructions exactly about when to stop eating and drinking. If you don't, your procedure may be canceled. If your doctor told you to take your medicines on the day of the procedure, take them with only a sip of water.     · Take a bath or shower before you come in for your procedure. Do not apply lotions, perfumes, deodorants, or nail polish.     · Take off all jewelry and piercings. And take out contact lenses, if you wear them. At the hospital or surgery center   · Bring a picture ID.     · The test may take 15 to 30 minutes.     · The doctor may spray medicine on the back of your throat to numb it.  You also will get medicine to prevent pain and to relax you.     · You will lie on your left side. The doctor will put the scope in your mouth and toward the back of your throat. The doctor will tell you when to swallow. This helps the scope move down your throat. You will be able to breathe normally. The doctor will move the scope down your esophagus into your stomach. The doctor also may look at the duodenum.     · If your doctor wants to take a sample of tissue for a biopsy, he or she may use small surgical tools, which are put into the scope, to cut off some tissue. You will not feel a biopsy, if one is taken. The doctor also can use the tools to stop bleeding or to do other treatments, if needed.     · You will stay at the hospital or surgery center for 1 to 2 hours until the medicine you were given wears off. What happens after an upper GI endoscopy? · After the test, you may belch and feel bloated for a while.     · You may have a tickling, dry throat or mouth. You may feel a bit hoarse, and you may have a mild sore throat. These symptoms may last several days. Throat lozenges and warm saltwater gargles can help relieve the throat symptoms.     · Ask your doctor when you can drive again.     · Your doctor will tell you when you can go back to your usual diet and activities.     · Don't drink alcohol for 12 to 24 hours after the test.   When should you call your doctor? · You have questions or concerns.     · You don't understand how to prepare for your procedure.     · You become ill before the procedure (such as fever, flu, or a cold).     · You need to reschedule or have changed your mind about having the procedure. Where can you learn more? Go to https://[employer].Formotus. org and sign in to your [employer] account. Enter P790 in the [employer] box to learn more about \"Upper GI Endoscopy: Before Your Procedure. \"     If you do not have an account, please click on the \"Sign Up Now\" link.   Current as of: September 8, 2021               Content Version: 13.1  © 9400-6158 Healthwise, Incorporated. Care instructions adapted under license by [facility] ([facility]). If you have questions about a medical condition or this instruction, always ask your healthcare professional. [address] any warranty or liability for your use of this information.

## 2022-02-21 ENCOUNTER — TELEPHONE (OUTPATIENT)
Dept: SURGERY | Age: 37
End: 2022-02-21

## 2022-04-13 ENCOUNTER — HOSPITAL ENCOUNTER (OUTPATIENT)
Dept: ULTRASOUND IMAGING | Age: 37
Discharge: HOME | End: 2022-04-15
Payer: MEDICARE

## 2022-04-13 DIAGNOSIS — R10.9 CHRONIC ABDOMINAL PAIN: ICD-10-CM

## 2022-04-13 DIAGNOSIS — G89.29 CHRONIC ABDOMINAL PAIN: ICD-10-CM

## 2022-04-13 PROCEDURE — 76705 ECHO EXAM OF ABDOMEN: CPT

## 2022-04-18 ENCOUNTER — OFFICE VISIT (OUTPATIENT)
Dept: SURGERY | Age: 37
End: 2022-04-18
Payer: MEDICARE

## 2022-04-18 VITALS
HEART RATE: 63 BPM | WEIGHT: 203 LBS | BODY MASS INDEX: 32.62 KG/M2 | HEIGHT: 66 IN | OXYGEN SATURATION: 97 % | TEMPERATURE: 98.1 F | DIASTOLIC BLOOD PRESSURE: 68 MMHG | RESPIRATION RATE: 18 BRPM | SYSTOLIC BLOOD PRESSURE: 112 MMHG

## 2022-04-18 DIAGNOSIS — K21.9 GASTROESOPHAGEAL REFLUX DISEASE, UNSPECIFIED WHETHER ESOPHAGITIS PRESENT: ICD-10-CM

## 2022-04-18 DIAGNOSIS — Z87.11 HISTORY OF PEPTIC ULCER: ICD-10-CM

## 2022-04-18 DIAGNOSIS — R19.5 LOOSE STOOLS: ICD-10-CM

## 2022-04-18 DIAGNOSIS — R10.9 PAIN, ABDOMINAL, NONSPECIFIC: Primary | ICD-10-CM

## 2022-04-18 PROCEDURE — 99213 OFFICE O/P EST LOW 20 MIN: CPT | Performed by: SURGERY

## 2022-04-18 PROCEDURE — 1036F TOBACCO NON-USER: CPT | Performed by: SURGERY

## 2022-04-18 PROCEDURE — G8419 CALC BMI OUT NRM PARAM NOF/U: HCPCS | Performed by: SURGERY

## 2022-04-18 PROCEDURE — G8427 DOCREV CUR MEDS BY ELIG CLIN: HCPCS | Performed by: SURGERY

## 2022-04-18 RX ORDER — DICYCLOMINE HYDROCHLORIDE 10 MG/1
10 CAPSULE ORAL 4 TIMES DAILY
Qty: 120 CAPSULE | Refills: 3 | Status: SHIPPED | OUTPATIENT
Start: 2022-04-18

## 2022-04-18 ASSESSMENT — ENCOUNTER SYMPTOMS
COUGH: 0
VOMITING: 0
ABDOMINAL DISTENTION: 1
BLOOD IN STOOL: 0
TROUBLE SWALLOWING: 0
DIARRHEA: 1
CHOKING: 0
SORE THROAT: 0
SHORTNESS OF BREATH: 0
NAUSEA: 0
ABDOMINAL PAIN: 1
BACK PAIN: 1

## 2022-04-18 NOTE — LETTER
[address] [address]  Phone: [phone]  Fax: [phone]    Abbie Lora MD    April 18, 2022     Clovis Henson, APRN - [facility]  [address]    Patient: Jas Carrero   MR Number: [id number]   YOB: 1985   Date of Visit: 4/18/2022       Dear Clovis Henson: Thank you for referring Renny Nation to me for evaluation/treatment. Below are the relevant portions of my assessment and plan of care. ASSESSMENT     Diagnosis Orders   1. Pain, abdominal, nonspecific  NM HEPATOBILIARY SCAN W EJECTION FRACTION    dicyclomine (BENTYL) 10 MG capsule   2. Gastroesophageal reflux disease, unspecified whether esophagitis present     3. History of peptic ulcer     4. Loose stools     5. BMI 32.0-32.9,adult         PLAN    Discussed at length with Ms. Gwendolyn Rosa her worsening symptoms of loose stools, urgency after meals, bloating and cramping, history of peptic ulcer disease, etc.  She has recently completely discontinued nicotine use. Moderate weight gain. Gallbladder ultrasound was normal.  HIDA scan is requested. If abnormal, will proceed with elective cholecystectomy. Otherwise we will repeat EGD. Risks, benefits, alternatives thoroughly reviewed and accepted by Ms. Elenita including GI bleeding, perforation, missed lesions, COVID-19 exposure/infection, etc.  Prescription for Bentyl provided, which is worked well for her in the past.     If you have questions, please do not hesitate to call me. I look forward to following [employer] along with you.     Sincerely,      Abbie Lora MD

## 2022-04-18 NOTE — PATIENT INSTRUCTIONS
stressful. This information will help you understand what youcan expect. And it will help you safely prepare for your procedure. Preparing for the procedure     Do not eat or drink anything for 6 to 8 hours before the test. An empty stomach helps your doctor see your stomach clearly during the test. It also reduces your chances of vomiting. If you vomit, there is a small risk that the vomit could enter your lungs. (This is called aspiration.) If the test is done in an emergency, a tube may be inserted through your nose or mouth to empty your stomach.      Do not take sucralfate (Carafate) or antacids on the day of the test. These medicines can make it hard for your doctor to see your upper GI tract.      If your doctor tells you to, stop taking iron supplements 7 to 14 days before the test.      Be sure you have someone to take you home. Anesthesia and pain medicine will make it unsafe for you to drive or get home on your own.      Understand exactly what procedure is planned, along with the risks, benefits, and other options.  Tell your doctor ALL the medicines, vitamins, supplements, and herbal remedies you take. Some may increase the risk of problems during your procedure. Your doctor will tell you if you should stop taking any of them before the procedure and how soon to do it.      If you take aspirin or some other blood thinner, ask your doctor if you should stop taking it before your procedure. Make sure that you understand exactly what your doctor wants you to do. These medicines increase the risk of bleeding.      Make sure your doctor and the hospital have a copy of your advance directive. If you don't have one, you may want to prepare one. It lets others know your health care wishes. It's a good thing to have before any type of surgery or procedure. What happens on the day of the procedure?  Follow the instructions exactly about when to stop eating and drinking.  If you don't, your procedure may be canceled. If your doctor told you to take your medicines on the day of the procedure, take them with only a sip of water.      Take a bath or shower before you come in for your procedure. Do not apply lotions, perfumes, deodorants, or nail polish.      Take off all jewelry and piercings. And take out contact lenses, if you wear them. At the hospital or surgery center    Bring a picture ID.      The test may take 15 to 30 minutes.      The doctor may spray medicine on the back of your throat to numb it. You also will get medicine to prevent pain and to relax you.      You will lie on your left side. The doctor will put the scope in your mouth and toward the back of your throat. The doctor will tell you when to swallow. This helps the scope move down your throat. You will be able to breathe normally. The doctor will move the scope down your esophagus into your stomach. The doctor also may look at the duodenum.      If your doctor wants to take a sample of tissue for a biopsy, he or she may use small surgical tools, which are put into the scope, to cut off some tissue. You will not feel a biopsy, if one is taken. The doctor also can use the tools to stop bleeding or to do other treatments, if needed.      You will stay at the hospital or surgery center for 1 to 2 hours until the medicine you were given wears off. What happens after an upper GI endoscopy?  After the test, you may belch and feel bloated for a while.      You may have a tickling, dry throat or mouth. You may feel a bit hoarse, and you may have a mild sore throat. These symptoms may last several days. Throat lozenges and warm saltwater gargles can help relieve the throat symptoms.      Ask your doctor when you can drive again.      Your doctor will tell you when you can go back to your usual diet and activities.      Don't drink alcohol for 12 to 24 hours after the test.   When should you call your doctor?     You have questions or concerns.      You don't understand how to prepare for your procedure.      You become ill before the procedure (such as fever, flu, or a cold).      You need to reschedule or have changed your mind about having the procedure. Where can you learn more? Go to https://chpepiceweb.[employer]. org and sign in to your [employer] account. Enter P790 in the [employer] box to learn more about \"Upper GI Endoscopy: Before Your Procedure. \"     If you do not have an account, please click on the \"Sign Up Now\" link. Current as of: September 8, 2021               Content Version: 13.2  © 0587-9365 Healthwise, Incorporated. Care instructions adapted under license by [facility] ([facility]). If you have questions about a medical condition or this instruction, always ask your healthcare professional. [address] any warranty or liability for your use of this information.

## 2022-04-18 NOTE — PROGRESS NOTES
Becca Page MD  General Surgery, Endoscopy  Chief Medical Officer    [facility] Norma Bryan  [facility] [address] [address]  Dept: [phone]  Fax: [phone]    CHIEF COMPLAINT  No chief complaint on file. HPI    Ms Bita Langford is a pleasant 78-year-old white female with history of type for proximal gastric ulcer, hiatal hernia and normal colon on endoscopy January 2021 who presents for reevaluation. She has chronic reflux symptoms, which have worsened over the last few months. Frequent loose stools and urgency after meals. Gallbladder ultrasound April 13 2022 showed a normal gallbladder without stones. She has gained weight since discontinuing tobacco use. 203 lbs, BMI 33. Normal appetite. Daily bowel movements without blood. No cough, fever nor other respiratory symptoms. Chronic back pain. No family history of colon cancer. She is completely discontinued nicotine use.     DATE OF PROCEDURE:  01/25/2021     ATTENDING SURGEON: Colleen Kenney MD     PCP: Vick Harris CNP     PREOPERATIVE DIAGNOSES:  1.  Chronic abdominal pain. 2.  History of gastric ulcer. 3.  Chronic constipation. 4.  Weight loss (approximately 35 pounds over 1 year). 5.  Tobacco abuse.     POSTOPERATIVE DIAGNOSES:  1.  Type 4 proximal gastric ulcer, superficial.  2.  Mild antral gastritis. 3.  Small hiatal hernia with grade 2 distal esophagitis. 4.  Normal colon, marginal prep.     OPERATION PERFORMED:  1.  Esophagogastroduodenoscopy. 2.  Distal esophageal biopsies, GE junction. 3.  Type 4 ulcer, margin biopsy, proximal stomach. 4.  Antral biopsies.   5.  Colonoscopy, anus to cecum.      INDICATIONS:  The patient is a 78-year-old white female with a long  history of chronic abdominal pain, constipation, reflux symptoms,  history of stomach ulcer 7 years ago, weight loss of 30 pounds over the  last 12 months, chronic back pain, less than 1-pack-per-day smoker.  At  this time, diagnostic endoscopy is indicated. Review of Systems   Constitutional: Negative for activity change, appetite change, chills, fever and unexpected weight change. HENT: Negative for nosebleeds, sneezing, sore throat and trouble swallowing. Eyes: Negative for visual disturbance. Respiratory: Negative for cough, choking and shortness of breath. Cardiovascular: Negative for chest pain, palpitations and leg swelling. Gastrointestinal: Positive for abdominal distention, abdominal pain and diarrhea. Negative for blood in stool, nausea and vomiting. Genitourinary: Negative for dysuria, flank pain and hematuria. Musculoskeletal: Positive for arthralgias and back pain. Negative for gait problem and myalgias. Allergic/Immunologic: Negative for immunocompromised state. Neurological: Negative for dizziness, seizures, syncope, weakness and headaches. Hematological: Does not bruise/bleed easily. Psychiatric/Behavioral: Positive for dysphoric mood. Negative for confusion and sleep disturbance. Past Medical History:   Diagnosis Date    Bipolar affective disorder ([facility] [address]     Depression     GERD (gastroesophageal reflux disease)     Hiatal hernia     Irritable bowel syndrome     Low back pain        Past Surgical History:   Procedure Laterality Date     SECTION      COLONOSCOPY N/A 2021    COLONOSCOPY performed by Servando [facility]quez MD at [facility] 2012    Ess[place]    UPPER GASTROINTESTINAL ENDOSCOPY N/A 2019    EGD BIOPSY and clotest performed by Servando Vasquez MD at [address] [place] [address] 2021    EGD BIOPSY performed by Servando [facility]quez MD at [facility] ENDOSCOPY       No family history on file.     Allergies:  See list    Current Outpatient Medications   Medication Sig Dispense Refill    Cyclobenzaprine HCl (FLEXERIL PO) Take by mouth      QUEtiapine (SEROQUEL) 100 MG tablet Take 100 mg by mouth 2 times daily       Marital Status: Not on file   Intimate Partner Violence:     Fear of Current or Ex-Partner: Not on file    Emotionally Abused: Not on file    Physically Abused: Not on file    Sexually Abused: Not on file   Housing Stability:     Unable to Pay for Housing in the Last Year: Not on file    Number of [place] in the Last Year: Not on file    Unstable Housing in the Last Year: Not on file       LMP 10/07/2018 (Approximate)      Physical Exam  Vitals and nursing note reviewed. Constitutional:       Appearance: She is well-developed. HENT:      Head: Normocephalic and atraumatic. Eyes:      General: No scleral icterus. Conjunctiva/sclera: Conjunctivae normal.      Pupils: Pupils are equal, round, and reactive to light. Neck:      Vascular: No JVD. Trachea: No tracheal deviation. Cardiovascular:      Rate and Rhythm: Normal rate and regular rhythm. Pulmonary:      Effort: Pulmonary effort is normal. No respiratory distress. Chest:      Chest wall: No tenderness. Abdominal:      General: There is no distension. Palpations: Abdomen is soft. There is no mass. Tenderness: There is no abdominal tenderness. There is no guarding or rebound. Musculoskeletal:         General: Normal range of motion. Cervical back: Normal range of motion and neck supple. Lymphadenopathy:      Cervical: No cervical adenopathy. Skin:     General: Skin is warm and dry. Findings: No erythema or rash. Neurological:      Mental Status: She is alert and oriented to person, place, and time. Cranial Nerves: No cranial nerve deficit. Psychiatric:         Behavior: Behavior normal.         Thought Content:  Thought content normal.         Judgment: Judgment normal.         IMAGING/LABS    US GALLBLADDER RUQ    Status: Final result       Order Providers    Authorizing Encounter Billing   Naya Calhoun MD [facility] ULTRASOUND ROOM 2 AT Nirav Landaverde MD            Signed by    Signed Date/Time Phone Pager   Jose KHOURY 4/13/2022  8:55 [phone]      Reading Providers    Read Date Phone Pager   Deion De Los Santos Wed Apr 13, 2022  8:55 -[phone]      All Reviewers List    Dima Leavitt MA on 4/14/2022 10:09     US GALLBLADDER RUQ: Patient Communication    Add Comments  Seen       Routing History    Priority Sent On From To Message Type    4/13/2022  8:58 AM Alex, [facility] Incoming Radiant Results From [address] Kristie Caba MD Results     Radiation Dose Estimates    No radiation information found for this patient  Narrative   EXAMINATION:   RIGHT UPPER QUADRANT ULTRASOUND       4/13/2022 8:20 am       COMPARISON:   CT abdomen and pelvis October 26, 2019.       HISTORY:   ORDERING SYSTEM PROVIDED HISTORY: Chronic abdominal pain   TECHNOLOGIST PROVIDED HISTORY:   This procedure can be scheduled via [facility].  Access your [facility] account by   visiting [email or web address].       FINDINGS:   LIVER:  The liver demonstrates normal echogenicity without evidence of   intrahepatic biliary ductal dilatation.  Normal directional vascular flow.       BILIARY SYSTEM:  Gallbladder is unremarkable without evidence of   pericholecystic fluid, wall thickening or stones.  Negative sonographic   Youngblood's sign.       Common bile duct is within normal limits measuring 3.3 mm.       RIGHT KIDNEY: The right kidney is grossly unremarkable without evidence of   hydronephrosis. 12.1 x 6.4 x 5.6 cm.       PANCREAS:  Visualized portions of the pancreas are unremarkable.       OTHER: No evidence of right upper quadrant ascites.           Impression   Unremarkable right upper quadrant ultrasound.  No evidence of cholelithiasis. ASSESSMENT     Diagnosis Orders   1. Pain, abdominal, nonspecific  NM HEPATOBILIARY SCAN W EJECTION FRACTION    dicyclomine (BENTYL) 10 MG capsule   2. Gastroesophageal reflux disease, unspecified whether esophagitis present     3. History of peptic ulcer     4.  Loose stools     5. BMI 32.0-32.9,adult         PLAN    Discussed at length with Ms. Gwendolyn Rosa her worsening symptoms of loose stools, urgency after meals, bloating and cramping, history of peptic ulcer disease, etc.  She has recently completely discontinued nicotine use. Moderate weight gain. Gallbladder ultrasound was normal.  HIDA scan is requested. If abnormal, will proceed with elective cholecystectomy. Otherwise we will repeat EGD. Risks, benefits, alternatives thoroughly reviewed and accepted by Ms. Kwong including GI bleeding, perforation, missed lesions, COVID-19 exposure/infection, etc.  Prescription for Bentyl provided, which is worked well for her in the past.     Abbie Lora MD

## 2022-05-09 ENCOUNTER — HOSPITAL ENCOUNTER (OUTPATIENT)
Dept: NUCLEAR MEDICINE | Age: 37
Discharge: HOME | End: 2022-05-11
Payer: MEDICARE

## 2022-05-09 DIAGNOSIS — R10.9 PAIN, ABDOMINAL, NONSPECIFIC: ICD-10-CM

## 2022-05-09 PROCEDURE — 78226 HEPATOBILIARY SYSTEM IMAGING: CPT

## 2022-05-09 PROCEDURE — A9537 TC99M MEBROFENIN: HCPCS | Performed by: SURGERY

## 2022-05-09 PROCEDURE — 3430000000 HC RX DIAGNOSTIC RADIOPHARMACEUTICAL: Performed by: SURGERY

## 2022-05-09 RX ADMIN — Medication 5 MILLICURIE: at 11:38

## 2022-06-02 ENCOUNTER — TELEPHONE (OUTPATIENT)
Dept: SURGERY | Age: 37
End: 2022-06-02

## 2022-06-20 ENCOUNTER — HOSPITAL ENCOUNTER (OUTPATIENT)
Age: 37
Setting detail: SPECIMEN
Discharge: HOME | End: 2022-06-20

## 2022-06-21 LAB
-: ABNORMAL
AMORPHOUS: ABNORMAL
BILIRUBIN URINE: NEGATIVE
COLOR: YELLOW
EPITHELIAL CELLS UA: ABNORMAL /HPF (ref 0–5)
GLUCOSE URINE: NEGATIVE
KETONES, URINE: NEGATIVE
LEUKOCYTE ESTERASE, URINE: NEGATIVE
MUCUS: ABNORMAL
NITRITE, URINE: NEGATIVE
PH UA: 7.5 (ref 5–8)
PROTEIN UA: NEGATIVE
RBC UA: ABNORMAL /HPF (ref 0–2)
SPECIFIC GRAVITY UA: 1.01 (ref 1–1.03)
TURBIDITY: ABNORMAL
URINE HGB: NEGATIVE
UROBILINOGEN, URINE: NORMAL
WBC UA: ABNORMAL /HPF (ref 0–5)

## 2023-04-10 NOTE — PROGRESS NOTES

## 2023-11-14 ENCOUNTER — HOSPITAL ENCOUNTER (EMERGENCY)
Age: 38
Discharge: HOME | End: 2023-11-14
Payer: COMMERCIAL

## 2023-11-14 ENCOUNTER — APPOINTMENT (OUTPATIENT)
Dept: GENERAL RADIOLOGY | Age: 38
End: 2023-11-14
Payer: COMMERCIAL

## 2023-11-14 VITALS
OXYGEN SATURATION: 100 % | TEMPERATURE: 98.1 F | SYSTOLIC BLOOD PRESSURE: 124 MMHG | WEIGHT: 186 LBS | HEART RATE: 65 BPM | RESPIRATION RATE: 17 BRPM | DIASTOLIC BLOOD PRESSURE: 86 MMHG | BODY MASS INDEX: 30.02 KG/M2

## 2023-11-14 DIAGNOSIS — R25.2 LEG CRAMPS: ICD-10-CM

## 2023-11-14 DIAGNOSIS — F41.1 ANXIETY STATE: ICD-10-CM

## 2023-11-14 DIAGNOSIS — R07.9 CHEST PAIN, UNSPECIFIED TYPE: Primary | ICD-10-CM

## 2023-11-14 LAB
ANION GAP SERPL CALCULATED.3IONS-SCNC: 11 MMOL/L (ref 9–17)
BASOPHILS # BLD: 0.03 K/UL (ref 0–0.2)
BASOPHILS NFR BLD: 0 % (ref 0–2)
BUN SERPL-MCNC: 5 MG/DL (ref 6–20)
BUN/CREAT SERPL: 8 (ref 9–20)
CALCIUM SERPL-MCNC: 9.4 MG/DL (ref 8.6–10.4)
CHLORIDE SERPL-SCNC: 100 MMOL/L (ref 98–107)
CK SERPL-CCNC: 38 U/L (ref 26–192)
CO2 SERPL-SCNC: 24 MMOL/L (ref 20–31)
CREAT SERPL-MCNC: 0.6 MG/DL (ref 0.5–0.9)
D DIMER PPP FEU-MCNC: 0.32 UG/ML FEU (ref 0–0.59)
EKG ATRIAL RATE: 65 BPM
EKG P AXIS: 53 DEGREES
EKG P-R INTERVAL: 166 MS
EKG Q-T INTERVAL: 382 MS
EKG QRS DURATION: 92 MS
EKG QTC CALCULATION (BAZETT): 397 MS
EKG R AXIS: 44 DEGREES
EKG T AXIS: 46 DEGREES
EKG VENTRICULAR RATE: 65 BPM
EOSINOPHIL # BLD: 0.22 K/UL (ref 0–0.44)
EOSINOPHILS RELATIVE PERCENT: 2 % (ref 1–4)
ERYTHROCYTE [DISTWIDTH] IN BLOOD BY AUTOMATED COUNT: 13.4 % (ref 11.8–14.4)
GFR SERPL CREATININE-BSD FRML MDRD: >60 ML/MIN/1.73M2
GLUCOSE SERPL-MCNC: 117 MG/DL (ref 70–99)
HCT VFR BLD AUTO: 39.1 % (ref 36.3–47.1)
HGB BLD-MCNC: 12.9 G/DL (ref 11.9–15.1)
IMM GRANULOCYTES # BLD AUTO: 0.03 K/UL (ref 0–0.3)
IMM GRANULOCYTES NFR BLD: 0 %
LYMPHOCYTES NFR BLD: 2.52 K/UL (ref 1.1–3.7)
LYMPHOCYTES RELATIVE PERCENT: 28 % (ref 24–43)
MCH RBC QN AUTO: 31.1 PG (ref 25.2–33.5)
MCHC RBC AUTO-ENTMCNC: 33 G/DL (ref 28.4–34.8)
MCV RBC AUTO: 94.2 FL (ref 82.6–102.9)
MONOCYTES NFR BLD: 0.44 K/UL (ref 0.1–1.2)
MONOCYTES NFR BLD: 5 % (ref 3–12)
NEUTROPHILS NFR BLD: 65 % (ref 36–65)
NEUTS SEG NFR BLD: 5.9 K/UL (ref 1.5–8.1)
NRBC BLD-RTO: 0 PER 100 WBC
PLATELET # BLD AUTO: 276 K/UL (ref 138–453)
PMV BLD AUTO: 10.4 FL (ref 8.1–13.5)
POTASSIUM SERPL-SCNC: 4 MMOL/L (ref 3.7–5.3)
RBC # BLD AUTO: 4.15 M/UL (ref 3.95–5.11)
SARS-COV-2 RDRP RESP QL NAA+PROBE: NOT DETECTED
SODIUM SERPL-SCNC: 135 MMOL/L (ref 135–144)
SPECIMEN DESCRIPTION: NORMAL
TROPONIN I SERPL HS-MCNC: <6 NG/L (ref 0–14)
WBC OTHER # BLD: 9.1 K/UL (ref 3.5–11.3)

## 2023-11-14 PROCEDURE — 85025 COMPLETE CBC W/AUTO DIFF WBC: CPT

## 2023-11-14 PROCEDURE — 87635 SARS-COV-2 COVID-19 AMP PRB: CPT

## 2023-11-14 PROCEDURE — 99285 EMERGENCY DEPT VISIT HI MDM: CPT

## 2023-11-14 PROCEDURE — 6370000000 HC RX 637 (ALT 250 FOR IP): Performed by: PHYSICIAN ASSISTANT

## 2023-11-14 PROCEDURE — 36415 COLL VENOUS BLD VENIPUNCTURE: CPT

## 2023-11-14 PROCEDURE — 93005 ELECTROCARDIOGRAM TRACING: CPT | Performed by: PHYSICIAN ASSISTANT

## 2023-11-14 PROCEDURE — 71045 X-RAY EXAM CHEST 1 VIEW: CPT

## 2023-11-14 PROCEDURE — 6360000002 HC RX W HCPCS: Performed by: PHYSICIAN ASSISTANT

## 2023-11-14 PROCEDURE — 84484 ASSAY OF TROPONIN QUANT: CPT

## 2023-11-14 PROCEDURE — 96374 THER/PROPH/DIAG INJ IV PUSH: CPT

## 2023-11-14 PROCEDURE — 80048 BASIC METABOLIC PNL TOTAL CA: CPT

## 2023-11-14 PROCEDURE — 93010 ELECTROCARDIOGRAM REPORT: CPT | Performed by: INTERNAL MEDICINE

## 2023-11-14 PROCEDURE — 85379 FIBRIN DEGRADATION QUANT: CPT

## 2023-11-14 PROCEDURE — 82550 ASSAY OF CK (CPK): CPT

## 2023-11-14 RX ORDER — OXCARBAZEPINE 300 MG/1
300 TABLET, FILM COATED ORAL 2 TIMES DAILY
COMMUNITY

## 2023-11-14 RX ORDER — KETOROLAC TROMETHAMINE 30 MG/ML
30 INJECTION, SOLUTION INTRAMUSCULAR; INTRAVENOUS ONCE
Status: DISCONTINUED | OUTPATIENT
Start: 2023-11-14 | End: 2023-11-14

## 2023-11-14 RX ORDER — CLONIDINE HYDROCHLORIDE 0.1 MG/1
0.1 TABLET ORAL 3 TIMES DAILY PRN
COMMUNITY

## 2023-11-14 RX ORDER — HYDROXYZINE 50 MG/1
50 TABLET, FILM COATED ORAL EVERY 8 HOURS PRN
Qty: 30 TABLET | Refills: 0 | Status: SHIPPED | OUTPATIENT
Start: 2023-11-14 | End: 2023-11-24

## 2023-11-14 RX ORDER — HYDROXYZINE PAMOATE 50 MG/1
50 CAPSULE ORAL ONCE
Status: COMPLETED | OUTPATIENT
Start: 2023-11-14 | End: 2023-11-14

## 2023-11-14 RX ORDER — HYDROXYZINE HYDROCHLORIDE 50 MG/ML
50 INJECTION, SOLUTION INTRAMUSCULAR ONCE
Status: DISCONTINUED | OUTPATIENT
Start: 2023-11-14 | End: 2023-11-14

## 2023-11-14 RX ORDER — KETOROLAC TROMETHAMINE 30 MG/ML
30 INJECTION, SOLUTION INTRAMUSCULAR; INTRAVENOUS ONCE
Status: COMPLETED | OUTPATIENT
Start: 2023-11-14 | End: 2023-11-14

## 2023-11-14 RX ADMIN — HYDROXYZINE PAMOATE 50 MG: 50 CAPSULE ORAL at 13:08

## 2023-11-14 RX ADMIN — KETOROLAC TROMETHAMINE 30 MG: 30 INJECTION, SOLUTION INTRAMUSCULAR at 13:08

## 2023-11-14 ASSESSMENT — ENCOUNTER SYMPTOMS
SINUS PRESSURE: 1
BACK PAIN: 0
DIARRHEA: 0
NAUSEA: 0
SHORTNESS OF BREATH: 0
COUGH: 0
SORE THROAT: 0
VOMITING: 0

## 2023-11-14 ASSESSMENT — PAIN SCALES - GENERAL
PAINLEVEL_OUTOF10: 6
PAINLEVEL_OUTOF10: 6

## 2023-11-14 ASSESSMENT — PAIN DESCRIPTION - ORIENTATION: ORIENTATION: LEFT;RIGHT

## 2023-11-14 ASSESSMENT — PAIN - FUNCTIONAL ASSESSMENT
PAIN_FUNCTIONAL_ASSESSMENT: 0-10
PAIN_FUNCTIONAL_ASSESSMENT: NONE - DENIES PAIN

## 2023-11-14 ASSESSMENT — PAIN DESCRIPTION - LOCATION
LOCATION: LEG
LOCATION: CHEST

## 2023-11-14 ASSESSMENT — PAIN DESCRIPTION - DESCRIPTORS: DESCRIPTORS: SHARP

## 2023-11-14 NOTE — DISCHARGE INSTRUCTIONS
Finish the antibiotic for your sinusitis. Increase hydration and nutrition while ill. Utilize hydroxyzine as needed for anxiety and chest pain.

## 2024-03-04 NOTE — PATIENT INSTRUCTIONS
SURVEY:    You may be receiving a survey from [employer] regarding your visit today. Please complete the survey to enable us to provide the highest quality of care to you and your family. If you cannot score us a very good on any question, please call the office to discuss how we could have made your experience a very good one. Thank you. Patient Education        Headache: Care Instructions  Your Care Instructions    Headaches have many possible causes. Most headaches aren't a sign of a more serious problem, and they will get better on their own. Home treatment may help you feel better faster. The doctor has checked you carefully, but problems can develop later. If you notice any problems or new symptoms, get medical treatment right away. Follow-up care is a key part of your treatment and safety. Be sure to make and go to all appointments, and call your doctor if you are having problems. It's also a good idea to know your test results and keep a list of the medicines you take. How can you care for yourself at home? · Do not drive if you have taken a prescription pain medicine. · Rest in a quiet, dark room until your headache is gone. Close your eyes and try to relax or go to sleep. Don't watch TV or read. · Put a cold, moist cloth or cold pack on the painful area for 10 to 20 minutes at a time. Put a thin cloth between the cold pack and your skin. · Use a warm, moist towel or a heating pad set on low to relax tight shoulder and neck muscles. · Have someone gently massage your neck and shoulders. · Take pain medicines exactly as directed. ? If the doctor gave you a prescription medicine for pain, take it as prescribed. ? If you are not taking a prescription pain medicine, ask your doctor if you can take an over-the-counter medicine.   · Be careful not to take pain medicine more often than the instructions allow, because you may get worse or more frequent headaches when the medicine wears off.  · Do not ignore new symptoms that occur with a headache, such as a fever, weakness or numbness, vision changes, or confusion. These may be signs of a more serious problem. To prevent headaches  · Keep a headache diary so you can figure out what triggers your headaches. Avoiding triggers may help you prevent headaches. Record when each headache began, how long it lasted, and what the pain was like (throbbing, aching, stabbing, or dull). Write down any other symptoms you had with the headache, such as nausea, flashing lights or dark spots, or sensitivity to bright light or loud noise. Note if the headache occurred near your period. List anything that might have triggered the headache, such as certain foods (chocolate, cheese, wine) or odors, smoke, bright light, stress, or lack of sleep. · Find healthy ways to deal with stress. Headaches are most common during or right after stressful times. Take time to relax before and after you do something that has caused a headache in the past.  · Try to keep your muscles relaxed by keeping good posture. Check your jaw, face, neck, and shoulder muscles for tension, and try relaxing them. When sitting at a desk, change positions often, and stretch for 30 seconds each hour. · Get plenty of sleep and exercise. · Eat regularly and well. Long periods without food can trigger a headache. · Treat yourself to a massage. Some people find that regular massages are very helpful in relieving tension. · Limit caffeine by not drinking too much coffee, tea, or soda. But don't quit caffeine suddenly, because that can also give you headaches. · Reduce eyestrain from computers by blinking frequently and looking away from the computer screen every so often. Make sure you have proper eyewear and that your monitor is set up properly, about an arm's length away. · Seek help if you have depression or anxiety. Your headaches may be linked to these conditions.  Treatment can both prevent X Size Of Lesion In Cm (Optional): 0 Detail Level: Detailed Procedure To Be Performed At Next Visit: Biopsy by punch method Introduction Text (Please End With A Colon): The following procedure was deferred:

## 2024-03-18 ENCOUNTER — APPOINTMENT (OUTPATIENT)
Dept: ULTRASOUND IMAGING | Age: 39
End: 2024-03-18
Payer: COMMERCIAL

## 2024-03-18 ENCOUNTER — HOSPITAL ENCOUNTER (EMERGENCY)
Age: 39
Discharge: HOME | End: 2024-03-18
Payer: COMMERCIAL

## 2024-03-18 VITALS
HEART RATE: 68 BPM | DIASTOLIC BLOOD PRESSURE: 73 MMHG | SYSTOLIC BLOOD PRESSURE: 140 MMHG | TEMPERATURE: 98 F | OXYGEN SATURATION: 99 % | RESPIRATION RATE: 16 BRPM

## 2024-03-18 DIAGNOSIS — N76.0 VAGINITIS AND VULVOVAGINITIS: Primary | ICD-10-CM

## 2024-03-18 DIAGNOSIS — R10.2 SUPRAPUBIC PAIN, ACUTE: ICD-10-CM

## 2024-03-18 DIAGNOSIS — T50.905A ADVERSE EFFECT OF DRUG, INITIAL ENCOUNTER: ICD-10-CM

## 2024-03-18 LAB
MICROORGANISM/AGENT SPEC: NORMAL
SPECIMEN DESCRIPTION: NORMAL

## 2024-03-18 PROCEDURE — 99284 EMERGENCY DEPT VISIT MOD MDM: CPT

## 2024-03-18 PROCEDURE — 76830 TRANSVAGINAL US NON-OB: CPT

## 2024-03-18 PROCEDURE — 87210 SMEAR WET MOUNT SALINE/INK: CPT

## 2024-03-18 PROCEDURE — 6370000000 HC RX 637 (ALT 250 FOR IP): Performed by: NURSE PRACTITIONER

## 2024-03-18 PROCEDURE — 87491 CHLMYD TRACH DNA AMP PROBE: CPT

## 2024-03-18 PROCEDURE — 87591 N.GONORRHOEAE DNA AMP PROB: CPT

## 2024-03-18 RX ORDER — CETIRIZINE HYDROCHLORIDE 10 MG/1
10 TABLET ORAL DAILY
Qty: 14 TABLET | Refills: 0 | Status: SHIPPED | OUTPATIENT
Start: 2024-03-18 | End: 2024-04-01

## 2024-03-18 RX ORDER — CETIRIZINE HYDROCHLORIDE 10 MG/1
10 TABLET ORAL DAILY
Status: DISCONTINUED | OUTPATIENT
Start: 2024-03-18 | End: 2024-03-18 | Stop reason: HOSPADM

## 2024-03-18 RX ORDER — FAMOTIDINE 20 MG/1
20 TABLET, FILM COATED ORAL ONCE
Status: COMPLETED | OUTPATIENT
Start: 2024-03-18 | End: 2024-03-18

## 2024-03-18 RX ORDER — FLUCONAZOLE 150 MG/1
TABLET ORAL
Qty: 3 TABLET | Refills: 0 | Status: SHIPPED | OUTPATIENT
Start: 2024-03-18

## 2024-03-18 RX ORDER — FAMOTIDINE 20 MG/1
20 TABLET, FILM COATED ORAL DAILY
Qty: 14 TABLET | Refills: 0 | Status: SHIPPED | OUTPATIENT
Start: 2024-03-18 | End: 2024-04-01

## 2024-03-18 RX ORDER — PREDNISONE 20 MG/1
20 TABLET ORAL ONCE
Status: COMPLETED | OUTPATIENT
Start: 2024-03-18 | End: 2024-03-18

## 2024-03-18 RX ADMIN — PREDNISONE 20 MG: 20 TABLET ORAL at 11:26

## 2024-03-18 RX ADMIN — FAMOTIDINE 20 MG: 20 TABLET, FILM COATED ORAL at 11:26

## 2024-03-18 RX ADMIN — CETIRIZINE HYDROCHLORIDE 10 MG: 10 TABLET, FILM COATED ORAL at 11:26

## 2024-03-18 ASSESSMENT — PAIN DESCRIPTION - LOCATION: LOCATION: ABDOMEN

## 2024-03-18 ASSESSMENT — ENCOUNTER SYMPTOMS: ABDOMINAL PAIN: 1

## 2024-03-18 ASSESSMENT — PAIN - FUNCTIONAL ASSESSMENT: PAIN_FUNCTIONAL_ASSESSMENT: 0-10

## 2024-03-18 ASSESSMENT — PAIN SCALES - GENERAL: PAINLEVEL_OUTOF10: 6

## 2024-03-18 NOTE — ED NOTES
Presented to the ED with vaginal itching with hives.  Recent yeast infection and treated for it.  Reports that she has an allergy to flagyl, and the cream she was prescribed was Metronidazole.

## 2024-03-18 NOTE — ED PROVIDER NOTES
[facility] ED  EMERGENCY DEPARTMENT ENCOUNTER      Pt Name: Maira Serrano  MRN: [id number]  Birthdate 1985  Date of evaluation: 3/18/2024  Provider: RAE Mehta - CNP  11:26 AM    CHIEF COMPLAINT       Chief Complaint   Patient presents with    Vaginal Itching     Patient had yeast infection, took full regimen of antibiotic. Patient prescribed Metronidazole cream and patient has allergy to Flagyl. Patient report to itchiness and breaking out in hives.         HISTORY OF PRESENT ILLNESS        Maira Serrano 39 yo female presents from [facility] to ED with c/o vaginal itching.  Patient reports 3 weeks ago she had dental work completed. was placed on amoxicillin.  Patient reports she always gets yeast infection after antibiotic use.  Patient tried over-the-counter Monistat with no improvement.  Patient was seen by Dr. Antunez who did cultures she had bacterial vaginosis, trichomoniasis.  Patient was treated with MetroGel.  Patient is allergic to Flagyl and had an allergic reaction to the MetroGel with hives.  She reports these are beginning to resolve with use of Benadryl.  Patient has here today for worsening symptoms and continued vaginal itching and now pelvic pain.    The history is provided by the patient. No  was used.       Nursing Notes were reviewed.    REVIEW OF SYSTEMS       Review of Systems   Gastrointestinal:  Positive for abdominal pain.   Genitourinary:  Positive for pelvic pain and vaginal discharge.   All other systems reviewed and are negative.      Except as noted above the remainder of the review of systems was reviewed and negative.       PAST MEDICAL HISTORY     Past Medical History:   Diagnosis Date    Bipolar affective disorder (HCC)     Depression     GERD (gastroesophageal reflux disease)     Hiatal hernia     Irritable bowel syndrome     Low back pain          SURGICAL HISTORY       Past Surgical History:   Procedure Laterality Date     SECTION

## 2024-03-18 NOTE — DISCHARGE INSTRUCTIONS
Increase water  Diflucan 150 mg 1 by mouth today, Friday and next Tuesday.  Famotidine 20 mg 1 by mouth daily x 14 days  Cetirizine 10 mg 1 by  mouth daily x 14 days.

## 2024-03-19 LAB
CHLAMYDIA DNA UR QL NAA+PROBE: NEGATIVE
N GONORRHOEA DNA UR QL NAA+PROBE: NEGATIVE
SPECIMEN DESCRIPTION: NORMAL

## 2024-06-10 ENCOUNTER — HOSPITAL ENCOUNTER (EMERGENCY)
Age: 39
Discharge: LEFT BEFORE DISCHARGE | End: 2024-06-10

## 2024-07-11 ENCOUNTER — HOSPITAL ENCOUNTER (EMERGENCY)
Age: 39
Discharge: HOME | End: 2024-07-11

## 2024-07-11 VITALS
OXYGEN SATURATION: 99 % | DIASTOLIC BLOOD PRESSURE: 85 MMHG | SYSTOLIC BLOOD PRESSURE: 124 MMHG | TEMPERATURE: 98.1 F | RESPIRATION RATE: 16 BRPM | HEART RATE: 70 BPM
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
## 2024-07-11 ASSESSMENT — PAIN DESCRIPTION - LOCATION: LOCATION: HEAD;NECK

## 2024-07-11 ASSESSMENT — PAIN SCALES - GENERAL: PAINLEVEL_OUTOF10: 7

## 2024-07-11 ASSESSMENT — PAIN - FUNCTIONAL ASSESSMENT: PAIN_FUNCTIONAL_ASSESSMENT: 0-10

## 2024-07-11 NOTE — ED NOTES
Patient was called twice for bed placement, pt is not in waiting area, immediately outside of waiting area, or in bathroom.

## 2024-07-11 NOTE — ED PROVIDER NOTES
Patient left prior to my evaluation.  I did not examine patient.  She was unable to be found in waiting area.     Davion Woody,   07/11/24 [id number]

## 2024-08-31 NOTE — TELEPHONE ENCOUNTER
LVM with patient regarding scheduled EGD 6/10.
Spoke with the patient, she did not plan to come to her scheduled procedure anyway as she is following with someone else.
[FreeTextEntry1] : Please refer to [facility] Consult Note. 
[FreeTextEntry1] : Please refer to [facility] Consult Note.

## (undated) DEVICE — GOWN,AURORA,NONRNF,XL,30/CS: Brand: MEDLINE

## (undated) DEVICE — SOLUTION IV IRRIG POUR BRL 0.9% SODIUM CHL 2F7124

## (undated) DEVICE — MEDI-VAC NON-CONDUCTIVE SUCTION TUBING 6MM X 6.1M (20 FT.) L: Brand: CARDINAL HEALTH

## (undated) DEVICE — FORCEPS BX L240CM JAW DIA2.4MM ORNG L CAP W/ NDL DISP RAD

## (undated) DEVICE — FORCEP SPEC RETRV BX AD 2 MMX155 CM 5 MM GI OVL CUP W/ NDL

## (undated) DEVICE — 1200CC SUCTION CANISTER WITH HYDROPHOBIC FILTER AND RED LID: Brand: BEMIS

## (undated) DEVICE — CANNULA ORAL NSL AD CO2 N INTUB O2 DEL DISP TRU LNK

## (undated) DEVICE — JELLY LUBRICATING 4OZ FLIP TOP TB E Z

## (undated) DEVICE — REAGENT TEST UREASE RAPD CLOTEST F/

## (undated) DEVICE — MEDI-VAC NON-CONDUCTIVE TUBING7MM X 30.5 (100FT): Brand: CARDINAL HEALTH

## (undated) DEVICE — FORCEPS BX L240CM JAW DIA2.2MM RAD JAW 4 HOT DISP

## (undated) DEVICE — BLOCK BITE AD OPN SZ 48FR MOUTHPC ENDOSCP STURDY W/ FOAM